# Patient Record
Sex: MALE | Race: WHITE | NOT HISPANIC OR LATINO | Employment: UNEMPLOYED | URBAN - METROPOLITAN AREA
[De-identification: names, ages, dates, MRNs, and addresses within clinical notes are randomized per-mention and may not be internally consistent; named-entity substitution may affect disease eponyms.]

---

## 2019-10-10 ENCOUNTER — HOSPITAL ENCOUNTER (OUTPATIENT)
Dept: URGENT CARE | Facility: CLINIC | Age: 19
Discharge: HOME | End: 2019-10-10

## 2022-02-27 ENCOUNTER — HOSPITAL ENCOUNTER (EMERGENCY)
Facility: HOSPITAL | Age: 22
Discharge: HOME | End: 2022-02-27
Attending: EMERGENCY MEDICINE | Admitting: EMERGENCY MEDICINE

## 2022-02-27 ENCOUNTER — APPOINTMENT (OUTPATIENT)
Dept: GENERAL RADIOLOGY | Facility: HOSPITAL | Age: 22
End: 2022-02-27

## 2022-02-27 VITALS
TEMPERATURE: 97.8 F | DIASTOLIC BLOOD PRESSURE: 87 MMHG | HEIGHT: 70 IN | HEART RATE: 89 BPM | BODY MASS INDEX: 26.99 KG/M2 | WEIGHT: 188.49 LBS | OXYGEN SATURATION: 98 % | RESPIRATION RATE: 16 BRPM | SYSTOLIC BLOOD PRESSURE: 154 MMHG

## 2022-02-27 DIAGNOSIS — S39.012A STRAIN OF LUMBAR REGION, INITIAL ENCOUNTER: Primary | ICD-10-CM

## 2022-02-27 PROCEDURE — 25010000002 KETOROLAC TROMETHAMINE PER 15 MG: Performed by: EMERGENCY MEDICINE

## 2022-02-27 PROCEDURE — 96372 THER/PROPH/DIAG INJ SC/IM: CPT

## 2022-02-27 PROCEDURE — 72100 X-RAY EXAM L-S SPINE 2/3 VWS: CPT

## 2022-02-27 PROCEDURE — 99282 EMERGENCY DEPT VISIT SF MDM: CPT

## 2022-02-27 PROCEDURE — 25010000002 ORPHENADRINE CITRATE PER 60 MG: Performed by: EMERGENCY MEDICINE

## 2022-02-27 RX ORDER — DIFLUNISAL 500 MG/1
500 TABLET, FILM COATED ORAL 2 TIMES DAILY PRN
Qty: 20 TABLET | Refills: 0 | Status: SHIPPED | OUTPATIENT
Start: 2022-02-27

## 2022-02-27 RX ORDER — METAXALONE 800 MG/1
800 TABLET ORAL 3 TIMES DAILY PRN
Qty: 21 TABLET | Refills: 0 | Status: SHIPPED | OUTPATIENT
Start: 2022-02-27

## 2022-02-27 RX ORDER — KETOROLAC TROMETHAMINE 30 MG/ML
60 INJECTION, SOLUTION INTRAMUSCULAR; INTRAVENOUS ONCE
Status: COMPLETED | OUTPATIENT
Start: 2022-02-27 | End: 2022-02-27

## 2022-02-27 RX ORDER — ORPHENADRINE CITRATE 30 MG/ML
60 INJECTION INTRAMUSCULAR; INTRAVENOUS ONCE
Status: COMPLETED | OUTPATIENT
Start: 2022-02-27 | End: 2022-02-27

## 2022-02-27 RX ADMIN — ORPHENADRINE CITRATE 60 MG: 60 INJECTION INTRAMUSCULAR; INTRAVENOUS at 07:35

## 2022-02-27 RX ADMIN — KETOROLAC TROMETHAMINE 60 MG: 30 INJECTION, SOLUTION INTRAMUSCULAR at 07:35

## 2022-08-30 ENCOUNTER — APPOINTMENT (OUTPATIENT)
Dept: GENERAL RADIOLOGY | Facility: HOSPITAL | Age: 22
End: 2022-08-30

## 2022-08-30 PROCEDURE — 73562 X-RAY EXAM OF KNEE 3: CPT

## 2022-08-30 PROCEDURE — 99283 EMERGENCY DEPT VISIT LOW MDM: CPT

## 2022-08-31 ENCOUNTER — HOSPITAL ENCOUNTER (EMERGENCY)
Facility: HOSPITAL | Age: 22
Discharge: HOME | End: 2022-08-31
Attending: EMERGENCY MEDICINE | Admitting: EMERGENCY MEDICINE

## 2022-08-31 VITALS
HEIGHT: 72 IN | RESPIRATION RATE: 18 BRPM | HEART RATE: 106 BPM | SYSTOLIC BLOOD PRESSURE: 141 MMHG | TEMPERATURE: 98.1 F | WEIGHT: 200.62 LBS | BODY MASS INDEX: 27.17 KG/M2 | DIASTOLIC BLOOD PRESSURE: 84 MMHG | OXYGEN SATURATION: 98 %

## 2022-08-31 DIAGNOSIS — S83.511A SPRAIN OF ANTERIOR CRUCIATE LIGAMENT OF RIGHT KNEE, INITIAL ENCOUNTER: Primary | ICD-10-CM

## 2023-07-08 ENCOUNTER — HOSPITAL ENCOUNTER (EMERGENCY)
Facility: HOSPITAL | Age: 23
Discharge: HOME | DRG: 603 | End: 2023-07-08
Attending: EMERGENCY MEDICINE | Admitting: EMERGENCY MEDICINE
Payer: COMMERCIAL

## 2023-07-08 VITALS
WEIGHT: 195.99 LBS | HEIGHT: 71 IN | TEMPERATURE: 98.3 F | RESPIRATION RATE: 16 BRPM | DIASTOLIC BLOOD PRESSURE: 87 MMHG | HEART RATE: 120 BPM | SYSTOLIC BLOOD PRESSURE: 142 MMHG | BODY MASS INDEX: 27.44 KG/M2 | OXYGEN SATURATION: 97 %

## 2023-07-08 DIAGNOSIS — L03.111 CELLULITIS OF RIGHT AXILLA: ICD-10-CM

## 2023-07-08 DIAGNOSIS — L02.411 ABSCESS OF RIGHT AXILLA: Primary | ICD-10-CM

## 2023-07-08 PROCEDURE — 87205 SMEAR GRAM STAIN: CPT | Performed by: EMERGENCY MEDICINE

## 2023-07-08 PROCEDURE — 87070 CULTURE OTHR SPECIMN AEROBIC: CPT | Performed by: EMERGENCY MEDICINE

## 2023-07-08 PROCEDURE — 87147 CULTURE TYPE IMMUNOLOGIC: CPT | Performed by: EMERGENCY MEDICINE

## 2023-07-08 PROCEDURE — 87186 SC STD MICRODIL/AGAR DIL: CPT | Performed by: EMERGENCY MEDICINE

## 2023-07-08 PROCEDURE — 63710000001 ONDANSETRON ODT 4 MG TABLET DISPERSIBLE: Performed by: EMERGENCY MEDICINE

## 2023-07-08 PROCEDURE — 99283 EMERGENCY DEPT VISIT LOW MDM: CPT

## 2023-07-08 RX ORDER — ONDANSETRON 4 MG/1
4 TABLET, ORALLY DISINTEGRATING ORAL ONCE
Status: COMPLETED | OUTPATIENT
Start: 2023-07-08 | End: 2023-07-08

## 2023-07-08 RX ORDER — LIDOCAINE HYDROCHLORIDE AND EPINEPHRINE 10; 10 MG/ML; UG/ML
10 INJECTION, SOLUTION INFILTRATION; PERINEURAL ONCE
Status: COMPLETED | OUTPATIENT
Start: 2023-07-08 | End: 2023-07-08

## 2023-07-08 RX ORDER — SULFAMETHOXAZOLE AND TRIMETHOPRIM 800; 160 MG/1; MG/1
1 TABLET ORAL 2 TIMES DAILY
Qty: 28 TABLET | Refills: 0 | Status: ON HOLD | OUTPATIENT
Start: 2023-07-08 | End: 2023-07-11

## 2023-07-08 RX ORDER — IBUPROFEN 800 MG/1
800 TABLET ORAL EVERY 6 HOURS PRN
Qty: 20 TABLET | Refills: 0 | Status: SHIPPED | OUTPATIENT
Start: 2023-07-08

## 2023-07-08 RX ORDER — GINSENG 100 MG
1 CAPSULE ORAL 2 TIMES DAILY
Qty: 14 G | Refills: 0 | Status: ON HOLD | OUTPATIENT
Start: 2023-07-08 | End: 2023-07-11

## 2023-07-08 RX ORDER — HYDROCODONE BITARTRATE AND ACETAMINOPHEN 7.5; 325 MG/1; MG/1
1 TABLET ORAL ONCE
Status: COMPLETED | OUTPATIENT
Start: 2023-07-08 | End: 2023-07-08

## 2023-07-08 RX ADMIN — ONDANSETRON 4 MG: 4 TABLET, ORALLY DISINTEGRATING ORAL at 14:06

## 2023-07-08 RX ADMIN — HYDROCODONE BITARTRATE AND ACETAMINOPHEN 1 TABLET: 7.5; 325 TABLET ORAL at 14:07

## 2023-07-08 RX ADMIN — LIDOCAINE HYDROCHLORIDE,EPINEPHRINE BITARTRATE 10 ML: 10; .01 INJECTION, SOLUTION INFILTRATION; PERINEURAL at 14:07

## 2023-07-08 NOTE — DISCHARGE INSTRUCTIONS
It is very important that you  the antibiotic that was prescribed to you during your last visit, as well as the 1 prescribed you today, and take both of them until they are all gone.  Also wash your wound 2 times a day with warm soapy water, apply the ointment that was prescribed to you today and then cover with a dressing.    The redness surrounding your abscess has been highlighted with a permanent marker.  Please keep an eye on the border of that outlined.  If the redness exceeds that after a full 24 hours of your antibiotic please return to the emergency department or follow-up with your primary care provider.    It anytime you develop a fever that cannot be controlled with Tylenol or Motrin, severe nausea, vomiting, diarrhea, or notice any odorous drainage from your wound please return to the ER otherwise follow-up with your primary care provider.

## 2023-07-08 NOTE — ED PROVIDER NOTES
Emergency Department Encounter    Date seen: 7/9/2023  Time: 1:54 PM EDT    Room number: 56/56    Chief Complaint: right axillary pain     HPI    History of Present Illness:  Patient is a 23 y.o. year old male who presents to the emergency department for evaluation of abscess to right axilla.  Patient states that he has had an abscess in his right axillary area for approximately 5 days.  He was seen 2 days ago at this facility and told that he had an enlarged lymph node with a questionable abscess.  He was placed on antibiotics.  He however was unable to retrieve those antibiotics from the pharmacy.  The area has continued to grow and become more red and painful in nature.  He rates his pain as a 9 on a scale 0-10.  He has had nausea but no vomiting or diarrhea.  He also denies fevers.    Independent Historian/Clinical History and Information was obtained by:   Patient and Chart    History is limited by: N/A      PCP: Canelo Bowers MD        Past Medical History:     No Known Allergies  History reviewed. No pertinent past medical history.  Past Surgical History:   Procedure Laterality Date    ANKLE OPEN REDUCTION INTERNAL FIXATION Right     KNEE ACL RECONSTRUCTION Right     KNEE ARTHROSCOPY W/ MENISCAL REPAIR Right      Family History   Problem Relation Age of Onset    Hypertension Father     Diabetes Maternal Grandfather     Hypertension Maternal Grandfather     Diabetes Cousin        Home Medications:  Prior to Admission medications    Medication Sig Start Date End Date Taking? Authorizing Provider   amoxicillin-clavulanate (AUGMENTIN) 875-125 MG per tablet Take 1 tablet by mouth 2 (Two) Times a Day for 10 days. 7/6/23 7/16/23  Alanna Santos APRN        Social History:   Social History     Tobacco Use    Smoking status: Every Day     Packs/day: 0.50     Years: 3.00     Pack years: 1.50     Types: Cigarettes    Smokeless tobacco: Never   Vaping Use    Vaping Use: Never used   Substance Use Topics    Alcohol use: Yes  "    Comment: occasional    Drug use: Not Currently     Types: Marijuana             Review of Systems:  Review of Systems   Constitutional:  Negative for chills and fever.   HENT:  Negative for congestion, ear pain and sore throat.    Eyes:  Negative for pain.   Respiratory:  Negative for cough, chest tightness and shortness of breath.    Cardiovascular:  Negative for chest pain.   Gastrointestinal:  Negative for abdominal pain, diarrhea, nausea and vomiting.   Genitourinary:  Negative for flank pain and hematuria.   Musculoskeletal:  Negative for joint swelling.   Skin:  Positive for wound (Abscess right armpit). Negative for pallor.   Neurological:  Negative for seizures and headaches.   All other systems reviewed and are negative.     Physical Exam:  /87 (BP Location: Left arm, Patient Position: Sitting)   Pulse 120   Temp 98.3 °F (36.8 °C) (Oral)   Resp 16   Ht 180.3 cm (71\")   Wt 88.9 kg (195 lb 15.8 oz)   SpO2 97%   BMI 27.33 kg/m²     Physical Exam  Vitals and nursing note reviewed.   Constitutional:       General: He is not in acute distress.     Appearance: Normal appearance. He is not ill-appearing, toxic-appearing or diaphoretic.   HENT:      Head: Normocephalic and atraumatic.      Mouth/Throat:      Mouth: Mucous membranes are moist.   Eyes:      General: No scleral icterus.  Cardiovascular:      Rate and Rhythm: Normal rate and regular rhythm.      Pulses: Normal pulses.      Heart sounds: Normal heart sounds.   Pulmonary:      Effort: Pulmonary effort is normal. No respiratory distress.      Breath sounds: Normal breath sounds.   Abdominal:      General: Abdomen is flat.      Palpations: Abdomen is soft.      Tenderness: There is no abdominal tenderness.   Musculoskeletal:         General: Normal range of motion.      Cervical back: Normal range of motion and neck supple.   Skin:     General: Skin is warm and dry.      Findings: Erythema (Right axilla and right upper arm.) present.      " Comments: Moderate sized abscess to right axilla area with some erythematous tissue extending down the back of the arm.  Area is red and warm to the touch.   Neurological:      Mental Status: He is alert and oriented to person, place, and time. Mental status is at baseline.      Sensory: No sensory deficit.                Procedures:  Incision & Drainage    Date/Time: 7/8/2023 3:19 PM  Performed by: Kacie Crow APRN  Authorized by: Gibran Brown MD     Consent:     Consent obtained:  Verbal    Consent given by:  Patient    Risks, benefits, and alternatives were discussed: yes      Risks discussed:  Bleeding, incomplete drainage, pain and infection    Alternatives discussed:  No treatment  Universal protocol:     Procedure explained and questions answered to patient or proxy's satisfaction: yes      Patient identity confirmed:  Verbally with patient  Location:     Type:  Abscess    Location:  Upper extremity    Upper extremity location:  Arm    Arm location:  R upper arm (right axilla)  Pre-procedure details:     Skin preparation:  Povidone-iodine  Sedation:     Sedation type:  None  Anesthesia:     Anesthesia method:  Local infiltration    Local anesthetic:  Lidocaine 1% WITH epi  Procedure type:     Complexity:  Simple  Procedure details:     Ultrasound guidance: no      Needle aspiration: no      Incision types:  Stab incision    Wound management:  Probed and deloculated and irrigated with saline    Drainage:  Bloody and purulent    Drainage amount:  Moderate    Wound treatment:  Wound left open  Post-procedure details:     Procedure completion:  Tolerated      Medical Decision Making:      Comorbidities that affect care:        External Notes reviewed:    Previous ED Note: Patient's last ER visit I have personally reviewed patient's previous medical encounters.      The following orders were placed and all results were independently analyzed by me:  Orders Placed This Encounter   Procedures     Incision & Drainage    Wound Culture - Wound, Axilla, Right       Medications Given in the Emergency Department:  Medications   ondansetron ODT (ZOFRAN-ODT) disintegrating tablet 4 mg (4 mg Oral Given 7/8/23 1406)   HYDROcodone-acetaminophen (NORCO) 7.5-325 MG per tablet 1 tablet (1 tablet Oral Given 7/8/23 1407)   lidocaine 1% - EPINEPHrine 1:987595 (XYLOCAINE W/EPI) 1 %-1:146703 injection 10 mL (10 mL Injection Given 7/8/23 1407)        ED Course:    ED Course as of 07/09/23 1716   Sat Jul 08, 2023   1530 Incision and drainage procedure completed.  Wound culture was obtained at that time.  Border of the redness surrounding the abscess was outlined with a permanent marker and patient given instructions to watch for increased reddening outside the border after taking antibiotics within 24 hours.  He was stressed the importance of medication compliance he.  Patient verbalized understanding. [MS]      ED Course User Index  [MS] Kacie Crow, KAN       Labs:    Lab Results (last 24 hours)       ** No results found for the last 24 hours. **             Imaging:    No Radiology Exams Resulted Within Past 24 Hours      Differential Diagnosis and Discussion:    Abscess: Differential diagnosis for an abscess includes but is not limited to bacterial or fungal infections, foreign body reactions, malignancies, and autoimmune or inflammatory conditions.        Patient Care Considerations:    CT EXTREMITY: I considered ordering an extremity CT, however abscess is fluctuant and does not appear to be tunneling.      Consultants/Shared Management Plan:    None    Social Determinants of Health:    Patient is independent, reliable, and has access to care.       Disposition and Care Coordination:    Discharged: The patient is suitable and stable for discharge with no need for consideration of observation or admission.    I have explained the patient´s condition, diagnoses and treatment plan based on the information available  to me at this time. I have answered questions and addressed any concerns. The patient has a good  understanding of the patient´s diagnosis, condition, and treatment plan as can be expected at this point. The vital signs have been stable. The patient´s condition is stable and appropriate for discharge from the emergency department.      The patient will pursue further outpatient evaluation with the primary care physician or other designated or consulting physician as outlined in the discharge instructions. They are agreeable to this plan of care and follow-up instructions have been explained in detail. The patient has received these instructions in written format and have expressed an understanding of the discharge instructions. The patient is aware that any significant change in condition or worsening of symptoms should prompt an immediate return to this or the closest emergency department or call to 911.    MDM  Number of Diagnoses or Management Options  Abscess of right axilla: new and does not require workup  Cellulitis of right axilla: new and does not require workup     Amount and/or Complexity of Data Reviewed  Review and summarize past medical records: yes (I have personally reviewed patient's previous medical encounters.  )    Risk of Complications, Morbidity, and/or Mortality  Presenting problems: moderate  Diagnostic procedures: low  Management options: moderate    Patient Progress  Patient progress: stable       Final diagnoses:   Abscess of right axilla   Cellulitis of right axilla        ED Disposition       ED Disposition   Discharge    Condition   Stable    Comment   --               This medical record created using voice recognition software.                       Kacie Crow APRN  07/09/23 1716       Kacie Crow APRN  07/09/23 1716

## 2023-07-10 LAB
ALBUMIN SERPL-MCNC: 4.6 G/DL (ref 3.5–5.2)
ALBUMIN/GLOB SERPL: 1.6 G/DL
ALP SERPL-CCNC: 76 U/L (ref 39–117)
ALT SERPL W P-5'-P-CCNC: 21 U/L (ref 1–41)
ANION GAP SERPL CALCULATED.3IONS-SCNC: 14.2 MMOL/L (ref 5–15)
AST SERPL-CCNC: 21 U/L (ref 1–40)
BASOPHILS # BLD AUTO: 0.02 10*3/MM3 (ref 0–0.2)
BASOPHILS NFR BLD AUTO: 0.3 % (ref 0–1.5)
BILIRUB SERPL-MCNC: 0.2 MG/DL (ref 0–1.2)
BUN SERPL-MCNC: 18 MG/DL (ref 6–20)
BUN/CREAT SERPL: 18 (ref 7–25)
CALCIUM SPEC-SCNC: 9.3 MG/DL (ref 8.6–10.5)
CHLORIDE SERPL-SCNC: 101 MMOL/L (ref 98–107)
CO2 SERPL-SCNC: 23.8 MMOL/L (ref 22–29)
CREAT SERPL-MCNC: 1 MG/DL (ref 0.76–1.27)
CRP SERPL-MCNC: 1.79 MG/DL (ref 0–0.5)
D-LACTATE SERPL-SCNC: 2.7 MMOL/L (ref 0.5–2)
DEPRECATED RDW RBC AUTO: 39.8 FL (ref 37–54)
EGFRCR SERPLBLD CKD-EPI 2021: 107.1 ML/MIN/1.73
EOSINOPHIL # BLD AUTO: 0.17 10*3/MM3 (ref 0–0.4)
EOSINOPHIL NFR BLD AUTO: 2.4 % (ref 0.3–6.2)
ERYTHROCYTE [DISTWIDTH] IN BLOOD BY AUTOMATED COUNT: 12.6 % (ref 12.3–15.4)
ERYTHROCYTE [SEDIMENTATION RATE] IN BLOOD: 9 MM/HR (ref 0–15)
GLOBULIN UR ELPH-MCNC: 2.9 GM/DL
GLUCOSE SERPL-MCNC: 71 MG/DL (ref 65–99)
HCT VFR BLD AUTO: 40.4 % (ref 37.5–51)
HGB BLD-MCNC: 13.7 G/DL (ref 13–17.7)
HOLD SPECIMEN: NORMAL
HOLD SPECIMEN: NORMAL
IMM GRANULOCYTES # BLD AUTO: 0.02 10*3/MM3 (ref 0–0.05)
IMM GRANULOCYTES NFR BLD AUTO: 0.3 % (ref 0–0.5)
LYMPHOCYTES # BLD AUTO: 2.49 10*3/MM3 (ref 0.7–3.1)
LYMPHOCYTES NFR BLD AUTO: 34.9 % (ref 19.6–45.3)
MCH RBC QN AUTO: 29.5 PG (ref 26.6–33)
MCHC RBC AUTO-ENTMCNC: 33.9 G/DL (ref 31.5–35.7)
MCV RBC AUTO: 86.9 FL (ref 79–97)
MONOCYTES # BLD AUTO: 0.67 10*3/MM3 (ref 0.1–0.9)
MONOCYTES NFR BLD AUTO: 9.4 % (ref 5–12)
NEUTROPHILS NFR BLD AUTO: 3.77 10*3/MM3 (ref 1.7–7)
NEUTROPHILS NFR BLD AUTO: 52.7 % (ref 42.7–76)
NRBC BLD AUTO-RTO: 0 /100 WBC (ref 0–0.2)
PLATELET # BLD AUTO: 254 10*3/MM3 (ref 140–450)
PMV BLD AUTO: 9.8 FL (ref 6–12)
POTASSIUM SERPL-SCNC: 3.1 MMOL/L (ref 3.5–5.2)
PROT SERPL-MCNC: 7.5 G/DL (ref 6–8.5)
RBC # BLD AUTO: 4.65 10*6/MM3 (ref 4.14–5.8)
SODIUM SERPL-SCNC: 139 MMOL/L (ref 136–145)
WBC NRBC COR # BLD: 7.14 10*3/MM3 (ref 3.4–10.8)
WHOLE BLOOD HOLD COAG: NORMAL
WHOLE BLOOD HOLD SPECIMEN: NORMAL

## 2023-07-10 PROCEDURE — 36415 COLL VENOUS BLD VENIPUNCTURE: CPT | Performed by: NURSE PRACTITIONER

## 2023-07-10 PROCEDURE — 80053 COMPREHEN METABOLIC PANEL: CPT | Performed by: NURSE PRACTITIONER

## 2023-07-10 PROCEDURE — 87040 BLOOD CULTURE FOR BACTERIA: CPT | Performed by: NURSE PRACTITIONER

## 2023-07-10 PROCEDURE — 86140 C-REACTIVE PROTEIN: CPT | Performed by: NURSE PRACTITIONER

## 2023-07-10 PROCEDURE — 83605 ASSAY OF LACTIC ACID: CPT | Performed by: NURSE PRACTITIONER

## 2023-07-10 PROCEDURE — 85652 RBC SED RATE AUTOMATED: CPT | Performed by: NURSE PRACTITIONER

## 2023-07-10 PROCEDURE — 85025 COMPLETE CBC W/AUTO DIFF WBC: CPT | Performed by: NURSE PRACTITIONER

## 2023-07-10 PROCEDURE — 99285 EMERGENCY DEPT VISIT HI MDM: CPT

## 2023-07-11 ENCOUNTER — APPOINTMENT (OUTPATIENT)
Dept: ULTRASOUND IMAGING | Facility: HOSPITAL | Age: 23
DRG: 603 | End: 2023-07-11
Payer: COMMERCIAL

## 2023-07-11 ENCOUNTER — HOSPITAL ENCOUNTER (INPATIENT)
Facility: HOSPITAL | Age: 23
LOS: 3 days | Discharge: HOME | DRG: 603 | End: 2023-07-14
Attending: EMERGENCY MEDICINE | Admitting: INTERNAL MEDICINE
Payer: COMMERCIAL

## 2023-07-11 DIAGNOSIS — L03.113 CELLULITIS OF RIGHT UPPER EXTREMITY: ICD-10-CM

## 2023-07-11 DIAGNOSIS — L03.113 CELLULITIS OF RIGHT ARM: ICD-10-CM

## 2023-07-11 DIAGNOSIS — L02.411 CUTANEOUS ABSCESS OF RIGHT AXILLA: Primary | ICD-10-CM

## 2023-07-11 DIAGNOSIS — L02.419 ABSCESS OF AXILLA: ICD-10-CM

## 2023-07-11 LAB
ALBUMIN SERPL-MCNC: 3.7 G/DL (ref 3.5–5.2)
ANION GAP SERPL CALCULATED.3IONS-SCNC: 11 MMOL/L (ref 5–15)
BACTERIA SPEC AEROBE CULT: ABNORMAL
BUN SERPL-MCNC: 16 MG/DL (ref 6–20)
BUN/CREAT SERPL: 18.2 (ref 7–25)
CALCIUM SPEC-SCNC: 8.3 MG/DL (ref 8.6–10.5)
CHLORIDE SERPL-SCNC: 108 MMOL/L (ref 98–107)
CO2 SERPL-SCNC: 19 MMOL/L (ref 22–29)
CREAT SERPL-MCNC: 0.88 MG/DL (ref 0.76–1.27)
D-LACTATE SERPL-SCNC: 0.6 MMOL/L (ref 0.5–2)
EGFRCR SERPLBLD CKD-EPI 2021: 122.4 ML/MIN/1.73
GLUCOSE SERPL-MCNC: 96 MG/DL (ref 65–99)
GRAM STN SPEC: ABNORMAL
PHOSPHATE SERPL-MCNC: 3.1 MG/DL (ref 2.5–4.5)
POTASSIUM SERPL-SCNC: 3.8 MMOL/L (ref 3.5–5.2)
SODIUM SERPL-SCNC: 138 MMOL/L (ref 136–145)

## 2023-07-11 PROCEDURE — 25010000002 HEPARIN (PORCINE) PER 1000 UNITS: Performed by: FAMILY MEDICINE

## 2023-07-11 PROCEDURE — 25010000002 ONDANSETRON PER 1 MG: Performed by: NURSE PRACTITIONER

## 2023-07-11 PROCEDURE — 25010000002 KETOROLAC TROMETHAMINE PER 15 MG: Performed by: NURSE PRACTITIONER

## 2023-07-11 PROCEDURE — 25010000002 HYDROMORPHONE 1 MG/ML SOLUTION: Performed by: NURSE PRACTITIONER

## 2023-07-11 PROCEDURE — 25010000002 HYDROMORPHONE 1 MG/ML SOLUTION: Performed by: INTERNAL MEDICINE

## 2023-07-11 PROCEDURE — 25010000002 VANCOMYCIN 5 G RECONSTITUTED SOLUTION: Performed by: BEHAVIOR TECHNICIAN

## 2023-07-11 PROCEDURE — 87040 BLOOD CULTURE FOR BACTERIA: CPT | Performed by: NURSE PRACTITIONER

## 2023-07-11 PROCEDURE — 25010000002 PIPERACILLIN SOD-TAZOBACTAM PER 1 G: Performed by: FAMILY MEDICINE

## 2023-07-11 PROCEDURE — 76882 US LMTD JT/FCL EVL NVASC XTR: CPT

## 2023-07-11 PROCEDURE — 99254 IP/OBS CNSLTJ NEW/EST MOD 60: CPT | Performed by: SURGERY

## 2023-07-11 PROCEDURE — 80069 RENAL FUNCTION PANEL: CPT | Performed by: FAMILY MEDICINE

## 2023-07-11 PROCEDURE — 87205 SMEAR GRAM STAIN: CPT | Performed by: NURSE PRACTITIONER

## 2023-07-11 PROCEDURE — 99222 1ST HOSP IP/OBS MODERATE 55: CPT | Performed by: FAMILY MEDICINE

## 2023-07-11 PROCEDURE — 87147 CULTURE TYPE IMMUNOLOGIC: CPT | Performed by: NURSE PRACTITIONER

## 2023-07-11 PROCEDURE — 83605 ASSAY OF LACTIC ACID: CPT | Performed by: NURSE PRACTITIONER

## 2023-07-11 PROCEDURE — 25010000002 PIPERACILLIN SOD-TAZOBACTAM PER 1 G: Performed by: BEHAVIOR TECHNICIAN

## 2023-07-11 PROCEDURE — 87070 CULTURE OTHR SPECIMN AEROBIC: CPT | Performed by: NURSE PRACTITIONER

## 2023-07-11 RX ORDER — HYDROCODONE BITARTRATE AND ACETAMINOPHEN 7.5; 325 MG/1; MG/1
1 TABLET ORAL EVERY 4 HOURS PRN
Status: DISCONTINUED | OUTPATIENT
Start: 2023-07-11 | End: 2023-07-14 | Stop reason: HOSPADM

## 2023-07-11 RX ORDER — SODIUM CHLORIDE 0.9 % (FLUSH) 0.9 %
10 SYRINGE (ML) INJECTION AS NEEDED
Status: DISCONTINUED | OUTPATIENT
Start: 2023-07-11 | End: 2023-07-14 | Stop reason: HOSPADM

## 2023-07-11 RX ORDER — AMOXICILLIN 250 MG
2 CAPSULE ORAL 2 TIMES DAILY
Status: DISCONTINUED | OUTPATIENT
Start: 2023-07-11 | End: 2023-07-14 | Stop reason: HOSPADM

## 2023-07-11 RX ORDER — SODIUM CHLORIDE 9 MG/ML
100 INJECTION, SOLUTION INTRAVENOUS CONTINUOUS
Status: DISCONTINUED | OUTPATIENT
Start: 2023-07-11 | End: 2023-07-11

## 2023-07-11 RX ORDER — POLYETHYLENE GLYCOL 3350 17 G/17G
17 POWDER, FOR SOLUTION ORAL DAILY PRN
Status: DISCONTINUED | OUTPATIENT
Start: 2023-07-11 | End: 2023-07-14 | Stop reason: HOSPADM

## 2023-07-11 RX ORDER — ONDANSETRON 2 MG/ML
4 INJECTION INTRAMUSCULAR; INTRAVENOUS ONCE
Status: COMPLETED | OUTPATIENT
Start: 2023-07-11 | End: 2023-07-11

## 2023-07-11 RX ORDER — CEFTRIAXONE SODIUM 1 G/50ML
1 INJECTION, SOLUTION INTRAVENOUS ONCE
Status: DISCONTINUED | OUTPATIENT
Start: 2023-07-11 | End: 2023-07-11

## 2023-07-11 RX ORDER — VANCOMYCIN/0.9 % SOD CHLORIDE 1.5G/250ML
1500 PLASTIC BAG, INJECTION (ML) INTRAVENOUS EVERY 12 HOURS
Status: DISCONTINUED | OUTPATIENT
Start: 2023-07-11 | End: 2023-07-12

## 2023-07-11 RX ORDER — NITROGLYCERIN 0.4 MG/1
0.4 TABLET SUBLINGUAL
Status: DISCONTINUED | OUTPATIENT
Start: 2023-07-11 | End: 2023-07-14 | Stop reason: HOSPADM

## 2023-07-11 RX ORDER — NICOTINE 21 MG/24HR
1 PATCH, TRANSDERMAL 24 HOURS TRANSDERMAL
Status: DISCONTINUED | OUTPATIENT
Start: 2023-07-11 | End: 2023-07-14 | Stop reason: HOSPADM

## 2023-07-11 RX ORDER — BISACODYL 10 MG
10 SUPPOSITORY, RECTAL RECTAL DAILY PRN
Status: DISCONTINUED | OUTPATIENT
Start: 2023-07-11 | End: 2023-07-14 | Stop reason: HOSPADM

## 2023-07-11 RX ORDER — POTASSIUM CHLORIDE 750 MG/1
40 CAPSULE, EXTENDED RELEASE ORAL ONCE
Status: COMPLETED | OUTPATIENT
Start: 2023-07-11 | End: 2023-07-11

## 2023-07-11 RX ORDER — BISACODYL 5 MG/1
5 TABLET, DELAYED RELEASE ORAL DAILY PRN
Status: DISCONTINUED | OUTPATIENT
Start: 2023-07-11 | End: 2023-07-14 | Stop reason: HOSPADM

## 2023-07-11 RX ORDER — SODIUM CHLORIDE 9 MG/ML
40 INJECTION, SOLUTION INTRAVENOUS AS NEEDED
Status: DISCONTINUED | OUTPATIENT
Start: 2023-07-11 | End: 2023-07-14 | Stop reason: HOSPADM

## 2023-07-11 RX ORDER — SODIUM CHLORIDE 0.9 % (FLUSH) 0.9 %
10 SYRINGE (ML) INJECTION EVERY 12 HOURS SCHEDULED
Status: DISCONTINUED | OUTPATIENT
Start: 2023-07-11 | End: 2023-07-14 | Stop reason: HOSPADM

## 2023-07-11 RX ORDER — HEPARIN SODIUM 5000 [USP'U]/ML
5000 INJECTION, SOLUTION INTRAVENOUS; SUBCUTANEOUS EVERY 8 HOURS SCHEDULED
Status: DISCONTINUED | OUTPATIENT
Start: 2023-07-11 | End: 2023-07-14 | Stop reason: HOSPADM

## 2023-07-11 RX ORDER — ONDANSETRON 2 MG/ML
4 INJECTION INTRAMUSCULAR; INTRAVENOUS EVERY 6 HOURS PRN
Status: DISCONTINUED | OUTPATIENT
Start: 2023-07-11 | End: 2023-07-14 | Stop reason: HOSPADM

## 2023-07-11 RX ORDER — KETOROLAC TROMETHAMINE 15 MG/ML
15 INJECTION, SOLUTION INTRAMUSCULAR; INTRAVENOUS ONCE
Status: COMPLETED | OUTPATIENT
Start: 2023-07-11 | End: 2023-07-11

## 2023-07-11 RX ADMIN — SODIUM CHLORIDE 100 ML/HR: 9 INJECTION, SOLUTION INTRAVENOUS at 04:03

## 2023-07-11 RX ADMIN — PIPERACILLIN AND TAZOBACTAM 3.38 G: 3; .375 INJECTION, POWDER, LYOPHILIZED, FOR SOLUTION INTRAVENOUS at 00:47

## 2023-07-11 RX ADMIN — VANCOMYCIN HYDROCHLORIDE 1750 MG: 5 INJECTION, POWDER, LYOPHILIZED, FOR SOLUTION INTRAVENOUS at 01:25

## 2023-07-11 RX ADMIN — HYDROCODONE BITARTRATE AND ACETAMINOPHEN 1 TABLET: 7.5; 325 TABLET ORAL at 08:27

## 2023-07-11 RX ADMIN — SODIUM CHLORIDE 1000 ML: 9 INJECTION, SOLUTION INTRAVENOUS at 00:30

## 2023-07-11 RX ADMIN — HYDROMORPHONE HYDROCHLORIDE 0.5 MG: 1 INJECTION, SOLUTION INTRAMUSCULAR; INTRAVENOUS; SUBCUTANEOUS at 09:28

## 2023-07-11 RX ADMIN — Medication 10 ML: at 21:16

## 2023-07-11 RX ADMIN — ONDANSETRON 4 MG: 2 INJECTION INTRAMUSCULAR; INTRAVENOUS at 00:30

## 2023-07-11 RX ADMIN — HYDROCODONE BITARTRATE AND ACETAMINOPHEN 1 TABLET: 7.5; 325 TABLET ORAL at 22:26

## 2023-07-11 RX ADMIN — KETOROLAC TROMETHAMINE 15 MG: 15 INJECTION, SOLUTION INTRAMUSCULAR; INTRAVENOUS at 00:29

## 2023-07-11 RX ADMIN — SENNOSIDES AND DOCUSATE SODIUM 2 TABLET: 50; 8.6 TABLET ORAL at 08:28

## 2023-07-11 RX ADMIN — POTASSIUM CHLORIDE 40 MEQ: 750 CAPSULE, EXTENDED RELEASE ORAL at 01:27

## 2023-07-11 RX ADMIN — HYDROMORPHONE HYDROCHLORIDE 0.5 MG: 1 INJECTION, SOLUTION INTRAMUSCULAR; INTRAVENOUS; SUBCUTANEOUS at 00:30

## 2023-07-11 RX ADMIN — HEPARIN SODIUM 5000 UNITS: 5000 INJECTION INTRAVENOUS; SUBCUTANEOUS at 21:16

## 2023-07-11 RX ADMIN — SENNOSIDES AND DOCUSATE SODIUM 2 TABLET: 50; 8.6 TABLET ORAL at 21:16

## 2023-07-11 RX ADMIN — PIPERACILLIN AND TAZOBACTAM 3.38 G: 3; .375 INJECTION, POWDER, LYOPHILIZED, FOR SOLUTION INTRAVENOUS at 07:55

## 2023-07-11 RX ADMIN — NICOTINE 1 PATCH: 14 PATCH, EXTENDED RELEASE TRANSDERMAL at 08:28

## 2023-07-11 RX ADMIN — VANCOMYCIN HYDROCHLORIDE 1500 MG: 5 INJECTION, POWDER, LYOPHILIZED, FOR SOLUTION INTRAVENOUS at 13:37

## 2023-07-11 RX ADMIN — HEPARIN SODIUM 5000 UNITS: 5000 INJECTION INTRAVENOUS; SUBCUTANEOUS at 13:37

## 2023-07-11 NOTE — H&P
"[facility]   HISTORY AND PHYSICAL    Patient Name: Mat Middleton  : 2000  MRN: [id number]  Primary Care Physician:  Canelo Bowers MD  Date of admission: 2023    Subjective   Subjective     Chief Complaint:   Right axilla swelling  History of Present Illness  Patient is a 23 y.o. year old male with essentially no past medical history.  He presents to the emergency department for evaluation of right axilla swelling, redness and pain. Patient is on his third visit for complaint. He was seen and treated for abscess initially, 2023, then returned for I&D 2023. Patient was given oral abx for treatment. States pain, swelling and redness have gotten worse. Denies fever/chills. Still taking his Bactrim he was prescribed last visit.  As of today he started having some drainage from the area however there is substantial swelling with lymphadenopathy extending to the elbow.  ER doctor wants to admit for further evaluation and IV antibiotics.  The plan is to I&D with packing placement here in the ER  Review of Systems   General-mild chills subjective fever  Skin-redness swelling right axilla with swelling and \"knots\" down to the elbow with associated pain.  All other systems reviewed and subsequently negative  Personal History     Past medical history  None    Past Surgical History:   Procedure Laterality Date   • ANKLE OPEN REDUCTION INTERNAL FIXATION Right    • KNEE ACL RECONSTRUCTION Right    • KNEE ARTHROSCOPY W/ MENISCAL REPAIR Right        Family History: family history includes Diabetes in his cousin and maternal grandfather; Hypertension in his father and maternal grandfather. Otherwise pertinent FHx was reviewed and not pertinent to current issue.    Social History:  reports that he has been smoking cigarettes. He has a 1.50 pack-year smoking history. He has never used smokeless tobacco. He reports current alcohol use. He reports that he does not currently use drugs after having used the " following drugs: Marijuana.    Home Medications:  amoxicillin-clavulanate, bacitracin, ibuprofen, and sulfamethoxazole-trimethoprim    Allergies:  No Known Allergies    Objective    Objective     Vitals:   Temp:  [97.8 °F (36.6 °C)] 97.8 °F (36.6 °C)  Heart Rate:  [87] 87  Resp:  [18] 18  BP: (142)/(72) 142/72    Physical Exam    Result Review    Result Review:  I have personally reviewed the results from the time of this admission to 7/11/2023 01:56 EDT and agree with these findings:  [x]  Laboratory list / accordion  []  Microbiology  []  Radiology  []  EKG/Telemetry   []  Cardiology/Vascular   []  Pathology  []  Old records  []  Other:  Most notable findings include: The only pertinent positive was a lactic of 2.7      Assessment & Plan   Assessment / Plan     Brief Patient Summary:  Mat Middleton is a 23 y.o. male who presents to the ER for the third time in less than a week for redness and swelling of the right axilla.  The area has now organized and is starting to drain.  Unfortunately also has associated lymphadenopathy.  The ER doctor will I&D the area and place packing, we will admit for IV antibiotics    Active Hospital Problems:  Active Hospital Problems    Diagnosis    • **Abscess of axilla    Lymphadenopathy right arm  Plan:   Admit the patient to the hospitalist service  Patient was started on broad-spectrum antibiotics of Zosyn and vancomycin in the ER which we will continue on the floor  We will hydrate with normal saline  We will continue to monitor his lab work including lactic acid levels  We will treat the pain with oral pain medications trying to limit the amount and duration to 3 days or less  Consult wound care for packing placement      DVT prophylaxis:  No DVT prophylaxis order currently exists.    CODE STATUS:    Code Status (Patient has no pulse and is not breathing): CPR (Attempt to Resuscitate)  Medical Interventions (Patient has pulse or is breathing): Full Support    Admission  Status:  I believe this patient meets inpatient status.    Maulik Huerta, DO

## 2023-07-11 NOTE — H&P
[facility]   HISTORY AND PHYSICAL    Patient Name: Mat Middleton  : 2000  MRN: [id number]  Primary Care Physician:  Canelo Bowers MD  Date of admission: 2023    Subjective   Subjective     Chief Complaint:   Right axillary pain and swelling      HPI:    Mat Middleton is a 23 y.o. male admitted last night from ER by hospitalist group.  Patient was seen in ER few times in last 1 week with oral antibiotics for the same problem.  Patient has swelling in the right axillary area with painful cystic lesion.  He says oral antibiotic did not work he came back yesterday, swelling was worse and painful.  There was no drainage.  He had throbbing in the arm.  There is no complaints of numbness tingling or swelling of the hand.  Patient admitted by hospitalist and started antibiotics.  Later on they found out patient belongs to our group and transferred to my care.  When I saw patient around 3 PM he is awake alert his swelling persist but he is feeling slightly better.        Review of Systems:    No fever chills  No chest pain  No shortness of breath  Pain in the arm and axillary area  Difficulty with movement    Personal History     History reviewed. No pertinent past medical history.    Past Surgical History:   Procedure Laterality Date    ANKLE OPEN REDUCTION INTERNAL FIXATION Right     KNEE ACL RECONSTRUCTION Right     KNEE ARTHROSCOPY W/ MENISCAL REPAIR Right        Family History: family history includes Diabetes in his cousin and maternal grandfather; Hypertension in his father and maternal grandfather. Otherwise pertinent FHx was reviewed and not pertinent to current issue.    Social History:  reports that he has been smoking cigarettes. He has a 1.50 pack-year smoking history. He has never used smokeless tobacco. He reports current alcohol use. He reports that he does not currently use drugs after having used the following drugs: Marijuana.    Home Medications:  ibuprofen and  mupirocin      Allergies:  No Known Allergies    Objective   Objective     Vitals:   Temp:  [97.4 °F (36.3 °C)-97.8 °F (36.6 °C)] 97.4 °F (36.3 °C)  Heart Rate:  [] 68  Resp:  [17-18] 18  BP: (121-142)/(65-90) 134/65    Physical Exam    Young male not in acute distress tired looking  Heart regular and lungs clear  Right axillary area on the medial aspect with an abscess/swelling extending into the axillary area and upper proximal arm  Tenderness present slight fluctuation  Peripheral pulses and capillary refill good        I have personally reviewed the results from the time of this admission to 7/11/2023 15:23 EDT and agree with these findings:  [x]  Laboratory  []  Microbiology  []  Radiology  []  EKG/Telemetry   []  Cardiology/Vascular   []  Pathology  []  Old records  []  Other:    CBC:    WBC   Date Value Ref Range Status   07/10/2023 7.14 3.40 - 10.80 10*3/mm3 Final     RBC   Date Value Ref Range Status   07/10/2023 4.65 4.14 - 5.80 10*6/mm3 Final     Hemoglobin   Date Value Ref Range Status   07/10/2023 13.7 13.0 - 17.7 g/dL Final     Hematocrit   Date Value Ref Range Status   07/10/2023 40.4 37.5 - 51.0 % Final     MCV   Date Value Ref Range Status   07/10/2023 86.9 79.0 - 97.0 fL Final     MCH   Date Value Ref Range Status   07/10/2023 29.5 26.6 - 33.0 pg Final     MCHC   Date Value Ref Range Status   07/10/2023 33.9 31.5 - 35.7 g/dL Final     RDW   Date Value Ref Range Status   07/10/2023 12.6 12.3 - 15.4 % Final     RDW-SD   Date Value Ref Range Status   07/10/2023 39.8 37.0 - 54.0 fl Final     MPV   Date Value Ref Range Status   07/10/2023 9.8 6.0 - 12.0 fL Final     Platelets   Date Value Ref Range Status   07/10/2023 254 140 - 450 10*3/mm3 Final     Neutrophil %   Date Value Ref Range Status   07/10/2023 52.7 42.7 - 76.0 % Final     Lymphocyte %   Date Value Ref Range Status   07/10/2023 34.9 19.6 - 45.3 % Final     Monocyte %   Date Value Ref Range Status   07/10/2023 9.4 5.0 - 12.0 % Final      Eosinophil %   Date Value Ref Range Status   07/10/2023 2.4 0.3 - 6.2 % Final     Basophil %   Date Value Ref Range Status   07/10/2023 0.3 0.0 - 1.5 % Final     Immature Grans %   Date Value Ref Range Status   07/10/2023 0.3 0.0 - 0.5 % Final     Neutrophils, Absolute   Date Value Ref Range Status   07/10/2023 3.77 1.70 - 7.00 10*3/mm3 Final     Lymphocytes, Absolute   Date Value Ref Range Status   07/10/2023 2.49 0.70 - 3.10 10*3/mm3 Final     Monocytes, Absolute   Date Value Ref Range Status   07/10/2023 0.67 0.10 - 0.90 10*3/mm3 Final     Eosinophils, Absolute   Date Value Ref Range Status   07/10/2023 0.17 0.00 - 0.40 10*3/mm3 Final     Basophils, Absolute   Date Value Ref Range Status   07/10/2023 0.02 0.00 - 0.20 10*3/mm3 Final     Immature Grans, Absolute   Date Value Ref Range Status   07/10/2023 0.02 0.00 - 0.05 10*3/mm3 Final     nRBC   Date Value Ref Range Status   07/10/2023 0.0 0.0 - 0.2 /100 WBC Final        BMP:    Lab Results   Component Value Date    GLUCOSE 96 07/11/2023    BUN 16 07/11/2023    CREATININE 0.88 07/11/2023    BCR 18.2 07/11/2023    K 3.8 07/11/2023    CO2 19.0 (L) 07/11/2023    CALCIUM 8.3 (L) 07/11/2023    ALBUMIN 3.7 07/11/2023    AST 21 07/10/2023    ALT 21 07/10/2023        No radiology results for the last day           Assessment & Plan   Assessment / Plan       Current Diagnosis:  Active Hospital Problems    Diagnosis     **Abscess of axilla      Plan:   Continue antibiotics.  Continue fluids.  Anti-inflammatory and pain control.  Surgical consult for possible drainage.  Further management be based on clinical course, lab results and consultant input      DVT prophylaxis:  Medical DVT prophylaxis orders are present.    GI Prophylaxis:       Pepcid    CODE STATUS:    Code Status (Patient has no pulse and is not breathing): CPR (Attempt to Resuscitate)  Medical Interventions (Patient has pulse or is breathing): Full Support    Admission Status:  I believe this patient meets  inpatient status.             I have dictated this note utilizing Dragon Dictation.             Please note that portions of this note were completed with a voice recognition program.             Part of this note may be an electronic transcription/translation of spoken language to printed text         using the Dragon Dictation System.       Electronically signed by Canelo Bowers MD, 07/11/23, 3:23 PM EDT.    Total time spent with in evaluation and management: 33 minutes

## 2023-07-11 NOTE — PLAN OF CARE
Goal Outcome Evaluation:   Patient A&O X4. VSS. Dressing has been changed. NPO after midnight for possible abscess drain.

## 2023-07-11 NOTE — PROGRESS NOTES
"[facility] Clinical Pharmacy Services: Vancomycin Pharmacokinetic Initial Consult Note    Mat Middleton is a 23 y.o. male who is on day 1 of pharmacy to dose vancomycin for Skin and Soft Tissue.    Consult Information  Consulting Provider: Triplet  Planned Duration of Therapy: 5 days  Was Patient Receiving Prior to Admission/Consult?: No  Loading Dose Ordered or Given: 1750 mg on 23 at 0130  PK/PD Target: -600 mg/L.hr   Relevant ID History:   Wound Culture   Date Value Ref Range Status   2023 Moderate growth (3+) Staphylococcus aureus, MRSA (A)  Preliminary     Comment:       Methicillin resistant Staphylococcus aureus, Patient may be an isolation risk.      Other Antimicrobials: Piperacillin/Tazobactam  Imaging Reviewed?: Yes    Microbiology Data  MRSA PCR performed: No; Result: Not ordered due to excluded indication or presence of suspected abscess  Culture/Source:   wound    Vitals/Labs  Ht: 180.3 cm (71\"); Wt: 91 kg (200 lb 9.9 oz)  Temp (24hrs), Av.8 °F (36.6 °C), Min:97.8 °F (36.6 °C), Max:97.8 °F (36.6 °C)   Estimated Creatinine Clearance: 130.3 mL/min (by C-G formula based on SCr of 1 mg/dL).        Results from last 7 days   Lab Units 07/10/23  2251 23  1851   CREATININE mg/dL 1.00 0.89   WBC 10*3/mm3 7.14 12.85*     Assessment/Plan:    Vancomycin Dose:  1500 mg IV every 12 hours; which provides the following predicted parameters:  Exposure target: AUC24 (range)400-600 mg/L.hr   AUC24,ss: 567 mg/L.hr  PAUC*: 82 %  Ctrough,ss: 17.1 mg/L  Pconc*: 38 %  Tox.: 13 %  Vanc Random ordered for 23 at 0900  Patient has order for Renal Function Panel    Pharmacy will follow patient's kidney function and will adjust doses and obtain levels as necessary. Thank you for involving pharmacy in this patient's care. Please contact pharmacy with any questions or concerns.                           Sean Blackwood [facility]  Clinical Pharmacist   "

## 2023-07-11 NOTE — ED PROVIDER NOTES
Time: 10:42 PM EDT  Date of encounter:  7/10/2023  Independent Historian/Clinical History and Information was obtained by:   Patient    History is limited by: N/A    Chief Complaint   Patient presents with    Arm Swelling         History of Present Illness:  Patient is a 23 y.o. year old male who presents to the emergency department for evaluation of right axilla swelling, redness and pain. Patient is on his third visit for complaint. He was seen and treated for abscess initially, 7/6/2023, then returned for I&D 7/8/2023. Patient was given oral abx for treatment. States pain, swelling and redness have gotten worse. Denies fever/chills. Still taking his Bactrim he was prescribed last visit.     HPI    Patient Care Team  Primary Care Provider: Canelo Bowers MD    Past Medical History:     No Known Allergies  No past medical history on file.  Past Surgical History:   Procedure Laterality Date    ANKLE OPEN REDUCTION INTERNAL FIXATION Right     KNEE ACL RECONSTRUCTION Right     KNEE ARTHROSCOPY W/ MENISCAL REPAIR Right      Family History   Problem Relation Age of Onset    Hypertension Father     Diabetes Maternal Grandfather     Hypertension Maternal Grandfather     Diabetes Cousin        Home Medications:  Prior to Admission medications    Medication Sig Start Date End Date Taking? Authorizing Provider   amoxicillin-clavulanate (AUGMENTIN) 875-125 MG per tablet Take 1 tablet by mouth 2 (Two) Times a Day for 10 days. 7/6/23 7/16/23  Alanna Santos APRN   bacitracin 500 UNIT/GM ointment Apply 1 application  topically to the appropriate area as directed 2 (Two) Times a Day. 7/8/23   Kacie Crow APRN   ibuprofen (ADVIL,MOTRIN) 800 MG tablet Take 1 tablet by mouth Every 6 (Six) Hours As Needed for Mild Pain. 7/8/23   Kacie Crow APRN   sulfamethoxazole-trimethoprim (BACTRIM DS,SEPTRA DS) 800-160 MG per tablet Take 1 tablet by mouth 2 (Two) Times a Day for 14 days. 7/8/23 7/22/23  Kacie Crow  "KAN        Social History:   Social History     Tobacco Use    Smoking status: Every Day     Packs/day: 0.50     Years: 3.00     Pack years: 1.50     Types: Cigarettes    Smokeless tobacco: Never   Vaping Use    Vaping Use: Never used   Substance Use Topics    Alcohol use: Yes     Comment: occasional    Drug use: Not Currently     Types: Marijuana         Review of Systems:  Review of Systems   Constitutional:  Negative for fever.   Skin:  Positive for wound.      Physical Exam:  /90   Pulse 84   Temp 97.8 °F (36.6 °C) (Oral)   Resp 18   Ht 180.3 cm (71\")   Wt 91 kg (200 lb 9.9 oz)   SpO2 95%   BMI 27.98 kg/m²         Physical Exam  Vitals and nursing note reviewed.   Constitutional:       General: He is not in acute distress.     Appearance: Normal appearance. He is not ill-appearing.   HENT:      Head: Normocephalic and atraumatic.      Nose: Nose normal.      Mouth/Throat:      Mouth: Mucous membranes are moist.   Eyes:      Extraocular Movements: Extraocular movements intact.      Pupils: Pupils are equal, round, and reactive to light.   Cardiovascular:      Rate and Rhythm: Normal rate and regular rhythm.      Heart sounds: Normal heart sounds.   Pulmonary:      Effort: Pulmonary effort is normal. No respiratory distress.      Breath sounds: Normal breath sounds. No wheezing.   Chest:      Chest wall: No tenderness.   Abdominal:      General: Abdomen is flat.   Musculoskeletal:         General: Normal range of motion.      Cervical back: Normal range of motion and neck supple.   Skin:     General: Skin is warm and dry.      Findings: Abscess and erythema present.          Neurological:      General: No focal deficit present.      Mental Status: He is alert and oriented to person, place, and time.   Psychiatric:         Mood and Affect: Mood normal.         Behavior: Behavior normal.      .              Procedures:  Incision & Drainage    Date/Time: 7/11/2023 2:03 AM  Performed by: Shabbir, Yusra, " "PA  Authorized by: Braxton Coto DO     Consent:     Consent obtained:  Verbal    Consent given by:  Patient    Risks discussed:  Bleeding, incomplete drainage, pain and infection  Universal protocol:     Patient identity confirmed:  Verbally with patient  Location:     Type:  Abscess    Size:  3 cm    Location:  Upper extremity    Upper extremity location:  Arm    Arm location:  R upper arm  Pre-procedure details:     Skin preparation:  Povidone-iodine  Anesthesia:     Anesthesia method:  Local infiltration    Local anesthetic:  Lidocaine 1% WITH epi  Procedure type:     Complexity:  Simple  Procedure details:     Incision types:  Single straight    Wound management:  Irrigated with saline    Drainage:  Bloody and purulent    Drainage amount:  Moderate    Wound treatment:  Wound left open    Packing materials:  1/4 in iodoform gauze    Amount 1/4\" iodoform:  5cm  Post-procedure details:     Procedure completion:  Tolerated well, no immediate complications      Medical Decision Making:      Comorbidities that affect care:    None    External Notes reviewed:    Previous ED Note: Patient seen on 7/8/2023 for abscess of right axilla      The following orders were placed and all results were independently analyzed by me:  Orders Placed This Encounter   Procedures    Incision & Drainage    Blood Culture - Blood,    Blood Culture - Blood,    Wound Culture - Wound, Axilla, Right    Comprehensive Metabolic Panel    [place] Draw    Lactic Acid, Plasma    Sedimentation Rate    C-reactive Protein    CBC Auto Differential    STAT Lactic Acid, Reflex    Code Status and Medical Interventions:    Hospitalist (on-call MD unless specified)    Insert Peripheral IV    Inpatient Admission    CBC & Differential    Green Top (Gel)    Lavender Top    Gold Top - SST    Light Blue Top       Medications Given in the Emergency Department:  Medications   sodium chloride 0.9 % flush 10 mL (has no administration in time range)   sodium " chloride 0.9 % bolus 1,000 mL (1,000 mL Intravenous New Bag 7/11/23 0030)   ondansetron (ZOFRAN) injection 4 mg (4 mg Intravenous Given 7/11/23 0030)   HYDROmorphone (DILAUDID) injection 0.5 mg (0.5 mg Intravenous Given 7/11/23 0030)   ketorolac (TORADOL) injection 15 mg (15 mg Intravenous Given 7/11/23 0029)   piperacillin-tazobactam (ZOSYN) 3.375 g/100 mL 0.9% NS IVPB (mbp) (0 g Intravenous Stopped 7/11/23 0117)   vancomycin 1750 mg/500 mL 0.9% NS IVPB ([facility]) (1,750 mg Intravenous New Bag 7/11/23 0125)   potassium chloride (MICRO-K) CR capsule 40 mEq (40 mEq Oral Given 7/11/23 0127)        ED Course:    The patient was initially evaluated in the triage area where orders were placed. The patient was later dispositioned by Yusra Shabbir, PA.      The patient was advised to stay for completion of workup which includes but is not limited to communication of labs and radiological results, reassessment and plan. The patient was advised that leaving prior to disposition by a provider could result in critical findings that are not communicated to the patient.     ED Course as of 07/11/23 0245   Mon Jul 10, 2023   2350 Received notification from lab for lactic acid of 2.7 [AR]   Tue Jul 11, 2023   0124 Dr. Coto spoke with Dr. Huerta, who is agreeable to accept patient under his service for IV antibiotics [YS]      ED Course User Index  [AR] Elif Alexis, APRN  [YS] Shabbir, Yusra, PA       Labs:    Lab Results (last 24 hours)       Procedure Component Value Units Date/Time    CBC & Differential [[id number]]  (Normal) Collected: 07/10/23 2251    Specimen: Blood Updated: 07/10/23 2258    Narrative:      The following orders were created for panel order CBC & Differential.  Procedure                               Abnormality         Status                     ---------                               -----------         ------                     CBC Auto Differential[[id number]]        Normal              Final result                  Please view results for these tests on the individual orders.    Comprehensive Metabolic Panel [[id number]]  (Abnormal) Collected: 07/10/23 2251    Specimen: Blood Updated: 07/10/23 2329     Glucose 71 mg/dL      BUN 18 mg/dL      Creatinine 1.00 mg/dL      Sodium 139 mmol/L      Potassium 3.1 mmol/L      Chloride 101 mmol/L      CO2 23.8 mmol/L      Calcium 9.3 mg/dL      Total Protein 7.5 g/dL      Albumin 4.6 g/dL      ALT (SGPT) 21 U/L      AST (SGOT) 21 U/L      Alkaline Phosphatase 76 U/L      Total Bilirubin 0.2 mg/dL      Globulin 2.9 gm/dL      A/G Ratio 1.6 g/dL      BUN/Creatinine Ratio 18.0     Anion Gap 14.2 mmol/L      eGFR 107.1 mL/min/1.73     Narrative:      GFR Normal >60  Chronic Kidney Disease <60  Kidney Failure <15      Blood Culture - Blood, Arm, Left [[id number]] Collected: 07/10/23 2251    Specimen: Blood from Arm, Left Updated: 07/10/23 2254    Lactic Acid, Plasma [[id number]]  (Abnormal) Collected: 07/10/23 2251    Specimen: Blood Updated: 07/10/23 2342     Lactate 2.7 mmol/L     Sedimentation Rate [[id number]]  (Normal) Collected: 07/10/23 2251    Specimen: Blood Updated: 07/10/23 2305     Sed Rate 9 mm/hr     C-reactive Protein [[id number]]  (Abnormal) Collected: 07/10/23 2251    Specimen: Blood Updated: 07/10/23 2329     C-Reactive Protein 1.79 mg/dL     CBC Auto Differential [[id number]]  (Normal) Collected: 07/10/23 2251    Specimen: Blood Updated: 07/10/23 2258     WBC 7.14 10*3/mm3      RBC 4.65 10*6/mm3      Hemoglobin 13.7 g/dL      Hematocrit 40.4 %      MCV 86.9 fL      MCH 29.5 pg      MCHC 33.9 g/dL      RDW 12.6 %      RDW-SD 39.8 fl      MPV 9.8 fL      Platelets 254 10*3/mm3      Neutrophil % 52.7 %      Lymphocyte % 34.9 %      Monocyte % 9.4 %      Eosinophil % 2.4 %      Basophil % 0.3 %      Immature Grans % 0.3 %      Neutrophils, Absolute 3.77 10*3/mm3      Lymphocytes, Absolute 2.49 10*3/mm3      Monocytes, Absolute 0.67 10*3/mm3      Eosinophils, Absolute  0.17 10*3/mm3      Basophils, Absolute 0.02 10*3/mm3      Immature Grans, Absolute 0.02 10*3/mm3      nRBC 0.0 /100 WBC     Blood Culture - Blood, Arm, Left [[id number]] Collected: 07/11/23 0029    Specimen: Blood from Arm, Left Updated: 07/11/23 0045    Wound Culture - Wound, Axilla, Right [[id number]] Collected: 07/11/23 0044    Specimen: Wound from Axilla, Right Updated: 07/11/23 0044             Imaging:    No Radiology Exams Resulted Within Past 24 Hours      Differential Diagnosis and Discussion:      Abscess: Differential diagnosis for an abscess includes but is not limited to bacterial or fungal infections, foreign body reactions, malignancies, and autoimmune or inflammatory conditions.    All labs were reviewed and interpreted by me.    MDM  Number of Diagnoses or Management Options  Cellulitis of right arm  Cutaneous abscess of right axilla  Diagnosis management comments: Patient's vital signs within normal limits in the emergency department.  Patient CBC unremarkable and white count within normal limits.  Patient CMP remarkable for potassium of 3.1.  Potassium replaced in the emergency department.  Patient's sed rate within normal limits.  Patient C-reactive protein increased at 1.79.  Patient's lactic acid elevated at 2.7.  Patient's blood cultures are pending.  Patient was started on Zosyn and vancomycin in the emergency department.  Patient was given Toradol and Dilaudid for pain.  I&D was performed of abscess, moderate amount of purulent and bloody drainage was noted.  Wound culture was collected.  Patient discussed with Dr. Huerta, who is agreeable to accept patient under his service.       Amount and/or Complexity of Data Reviewed  Clinical lab tests: reviewed             Patient Care Considerations:          Consultants/Shared Management Plan:    Hospitalist: I have discussed the case with Dr. Huerta who agrees to accept the patient for admission.    Social Determinants of Health:    Patient is  independent, reliable, and has access to care.       Disposition and Care Coordination:    Admit:   Through independent evaluation of the patient's history, physical, and imperical data, the patient meets criteria for observation/admission to the hospital.        Final diagnoses:   Cutaneous abscess of right axilla   Cellulitis of right arm        ED Disposition       ED Disposition   Decision to Admit    Condition   --    Comment   Level of Care: Med/Surg [1]   Diagnosis: Abscess of axilla [[id number]]   Admitting Physician: KARYN RICHTER [[id number]]   Certification: I Certify That Inpatient Hospital Services Are Medically Necessary For Greater Than 2 Midnights                 This medical record created using voice recognition software.             Shabbir, Yusra, PA  07/11/23 7691

## 2023-07-11 NOTE — ED PROVIDER NOTES
"Patient is 23 y.o. year old male that presents to the ED for evaluation of right axillary swelling, pain and drainage..     Physical Exam there is a draining abscess to the right axilla with some erythema present.    ED Course:    /69 (BP Location: Left arm, Patient Position: Lying)   Pulse 58   Temp 97.8 °F (36.6 °C) (Oral)   Resp 18   Ht 180.3 cm (70.98\")   Wt 90.8 kg (200 lb 2.8 oz)   SpO2 100%   BMI 27.93 kg/m²   Results for orders placed or performed during the hospital encounter of 07/11/23   Blood Culture - Blood, Arm, Left    Specimen: Arm, Left; Blood   Result Value Ref Range    Blood Culture No growth at 24 hours    Blood Culture - Blood, Arm, Left    Specimen: Arm, Left; Blood   Result Value Ref Range    Blood Culture No growth at 24 hours    Wound Culture - Wound, Axilla, Right    Specimen: Axilla, Right; Wound   Result Value Ref Range    Gram Stain No organisms seen    Comprehensive Metabolic Panel    Specimen: Blood   Result Value Ref Range    Glucose 71 65 - 99 mg/dL    BUN 18 6 - 20 mg/dL    Creatinine 1.00 0.76 - 1.27 mg/dL    Sodium 139 136 - 145 mmol/L    Potassium 3.1 (L) 3.5 - 5.2 mmol/L    Chloride 101 98 - 107 mmol/L    CO2 23.8 22.0 - 29.0 mmol/L    Calcium 9.3 8.6 - 10.5 mg/dL    Total Protein 7.5 6.0 - 8.5 g/dL    Albumin 4.6 3.5 - 5.2 g/dL    ALT (SGPT) 21 1 - 41 U/L    AST (SGOT) 21 1 - 40 U/L    Alkaline Phosphatase 76 39 - 117 U/L    Total Bilirubin 0.2 0.0 - 1.2 mg/dL    Globulin 2.9 gm/dL    A/G Ratio 1.6 g/dL    BUN/Creatinine Ratio 18.0 7.0 - 25.0    Anion Gap 14.2 5.0 - 15.0 mmol/L    eGFR 107.1 >60.0 mL/min/1.73   Lactic Acid, Plasma    Specimen: Blood   Result Value Ref Range    Lactate 2.7 (C) 0.5 - 2.0 mmol/L   Sedimentation Rate    Specimen: Blood   Result Value Ref Range    Sed Rate 9 0 - 15 mm/hr   C-reactive Protein    Specimen: Blood   Result Value Ref Range    C-Reactive Protein 1.79 (H) 0.00 - 0.50 mg/dL   CBC Auto Differential    Specimen: Blood   Result " Value Ref Range    WBC 7.14 3.40 - 10.80 10*3/mm3    RBC 4.65 4.14 - 5.80 10*6/mm3    Hemoglobin 13.7 13.0 - 17.7 g/dL    Hematocrit 40.4 37.5 - 51.0 %    MCV 86.9 79.0 - 97.0 fL    MCH 29.5 26.6 - 33.0 pg    MCHC 33.9 31.5 - 35.7 g/dL    RDW 12.6 12.3 - 15.4 %    RDW-SD 39.8 37.0 - 54.0 fl    MPV 9.8 6.0 - 12.0 fL    Platelets 254 140 - 450 10*3/mm3    Neutrophil % 52.7 42.7 - 76.0 %    Lymphocyte % 34.9 19.6 - 45.3 %    Monocyte % 9.4 5.0 - 12.0 %    Eosinophil % 2.4 0.3 - 6.2 %    Basophil % 0.3 0.0 - 1.5 %    Immature Grans % 0.3 0.0 - 0.5 %    Neutrophils, Absolute 3.77 1.70 - 7.00 10*3/mm3    Lymphocytes, Absolute 2.49 0.70 - 3.10 10*3/mm3    Monocytes, Absolute 0.67 0.10 - 0.90 10*3/mm3    Eosinophils, Absolute 0.17 0.00 - 0.40 10*3/mm3    Basophils, Absolute 0.02 0.00 - 0.20 10*3/mm3    Immature Grans, Absolute 0.02 0.00 - 0.05 10*3/mm3    nRBC 0.0 0.0 - 0.2 /100 WBC   STAT Lactic Acid, Reflex    Specimen: Blood   Result Value Ref Range    Lactate 0.6 0.5 - 2.0 mmol/L   Renal Function Panel    Specimen: Blood   Result Value Ref Range    Glucose 96 65 - 99 mg/dL    BUN 16 6 - 20 mg/dL    Creatinine 0.88 0.76 - 1.27 mg/dL    Sodium 138 136 - 145 mmol/L    Potassium 3.8 3.5 - 5.2 mmol/L    Chloride 108 (H) 98 - 107 mmol/L    CO2 19.0 (L) 22.0 - 29.0 mmol/L    Calcium 8.3 (L) 8.6 - 10.5 mg/dL    Albumin 3.7 3.5 - 5.2 g/dL    Phosphorus 3.1 2.5 - 4.5 mg/dL    Anion Gap 11.0 5.0 - 15.0 mmol/L    BUN/Creatinine Ratio 18.2 7.0 - 25.0    eGFR 122.4 >60.0 mL/min/1.73   Green Top (Gel)   Result Value Ref Range    Extra Tube Hold for add-ons.    Lavender Top   Result Value Ref Range    Extra Tube hold for add-on    Gold Top - SST   Result Value Ref Range    Extra Tube Hold for add-ons.    Light Blue Top   Result Value Ref Range    Extra Tube Hold for add-ons.      Medications   sodium chloride 0.9 % flush 10 mL (has no administration in time range)   sodium chloride 0.9 % flush 10 mL (10 mL Intravenous Given 7/11/23  2116)   sodium chloride 0.9 % flush 10 mL (has no administration in time range)   sodium chloride 0.9 % infusion 40 mL (has no administration in time range)   sennosides-docusate (PERICOLACE) 8.6-50 MG per tablet 2 tablet (2 tablets Oral Given 7/11/23 2116)     And   polyethylene glycol (MIRALAX) packet 17 g (has no administration in time range)     And   bisacodyl (DULCOLAX) EC tablet 5 mg (has no administration in time range)     And   bisacodyl (DULCOLAX) suppository 10 mg (has no administration in time range)   heparin (porcine) 5000 UNIT/ML injection 5,000 Units (0 Units Subcutaneous Hold 7/12/23 0541)   nitroglycerin (NITROSTAT) SL tablet 0.4 mg (has no administration in time range)   HYDROcodone-acetaminophen (NORCO) 7.5-325 MG per tablet 1 tablet (1 tablet Oral Given 7/11/23 2226)   ondansetron (ZOFRAN) injection 4 mg (has no administration in time range)   Pharmacy to dose vancomycin (has no administration in time range)   nicotine (NICODERM CQ) 14 MG/24HR patch 1 patch (1 patch Transdermal Medication Removed 7/11/23 2356)   vancomycin IVPB 1500 mg in 0.9% NaCl (Premix) 500 mL (1,500 mg Intravenous New Bag 7/12/23 0043)   sodium chloride 0.9 % flush 10 mL (10 mL Intravenous Not Given 7/12/23 0115)   sodium chloride 0.9 % flush 10 mL (has no administration in time range)   sodium chloride 0.9 % infusion 40 mL (has no administration in time range)   lactated ringers infusion (50 mL/hr Intravenous New Bag 7/12/23 0044)   sodium chloride 0.9 % bolus 1,000 mL (0 mL Intravenous Stopped 7/11/23 0100)   ondansetron (ZOFRAN) injection 4 mg (4 mg Intravenous Given 7/11/23 0030)   HYDROmorphone (DILAUDID) injection 0.5 mg (0.5 mg Intravenous Given 7/11/23 0030)   ketorolac (TORADOL) injection 15 mg (15 mg Intravenous Given 7/11/23 0029)   piperacillin-tazobactam (ZOSYN) 3.375 g/100 mL 0.9% NS IVPB (mbp) (0 g Intravenous Stopped 7/11/23 0117)   vancomycin 1750 mg/500 mL 0.9% NS IVPB ([facility]) (1,750 mg Intravenous New Bag  7/11/23 0125)   potassium chloride (MICRO-K) CR capsule 40 mEq (40 mEq Oral Given 7/11/23 0127)   HYDROmorphone (DILAUDID) injection 0.5 mg (0.5 mg Intravenous Given 7/11/23 0928)     US Axilla Right    Result Date: 7/11/2023  Narrative: PROCEDURE: US AXILLA RIGHT  COMPARISON: None  INDICATIONS: Evaluate for fluid collection to help with operative planning, previous I&D, induration on exam  TECHNIQUE: A targeted breast ultrasound was performed, with evaluation focusing only on specific areas of concern.  FINDINGS: Images are labeled right axilla, region of interest.  There is extensive subcutaneous edema and ill-defined 1.5 x 0.4 x 0.6 cm fluid collection (which is not well loculated ) extending to the skin surface where there reportedly is an open a wound.  There are multiple tiny echogenic foci superficially which are likely foci of air given history and presence of the open wound in this area immediately adjacent to the fluid is a 7 mm long curvilinear echogenic shadowing focus is also present adjacent to some ill-defined fluid.  This could represent a radiopaque foreign body in the appropriate clinical scenario.        Impression: 1. There is extensive subcutaneous edema and a 1.5 x 0.4 x 0.6 cm non loculated fluid collection extending to the skin surface where there is an open abdominal wound.  A loculated fluid collection to indicate a well-formed abscess is not demonstrated. 2. Adjacent to the fluid is a 7 mm long curvilinear shadowing echogenic focus , potentially a retained foreign body.  Correlation with clinical scenario is needed.  Correlation with clinical scenario is needed.     OBDULIO FLORES DO       Electronically Signed and Approved By: OBDULIO FLORES DO on 7/11/2023 at 16:57              MDM:    Procedures      The case was discussed between the FLORA and myself. Patient  care including, but not limited to ordered imaging, medications, and lab results were reviewed. I then performed the substantive  portion of the visit including all aspects of the medical decision making.        Braxton Coto DO  07:09 EDT  07/12/23         Braxton Coto,   07/12/23 0709

## 2023-07-11 NOTE — H&P (VIEW-ONLY)
General Surgery/Colorectal Surgery Note    Patient Name:  Mat Middleton  YOB: 2000  [id number]    Referring Provider: No ref. provider found      Patient Care Team:  Canelo Bowers MD as PCP - General (Internal Medicine)    Chief complaint abscess    Subjective .     History of present illness:    1 week history of a worsening abscess to his right axilla.  Previous I&D in the emergency department.  Was given p.o. antibiotics.  No improvement.  Came back to the emergency department with pain to the right axilla for further evaluation.  No fever.  Admitted to the hospitalist.  Started on antibiotics.  No blood thinner use.  Work-up with WBC 7.  Denies history of previous abscess.  No known history of MRSA.  No blood thinner use.      History:  History reviewed. No pertinent past medical history.    Past Surgical History:   Procedure Laterality Date    ANKLE OPEN REDUCTION INTERNAL FIXATION Right     KNEE ACL RECONSTRUCTION Right     KNEE ARTHROSCOPY W/ MENISCAL REPAIR Right        Family History   Problem Relation Age of Onset    Hypertension Father     Diabetes Maternal Grandfather     Hypertension Maternal Grandfather     Diabetes Cousin        Social History     Tobacco Use    Smoking status: Every Day     Packs/day: 0.50     Years: 3.00     Pack years: 1.50     Types: Cigarettes    Smokeless tobacco: Never   Vaping Use    Vaping Use: Never used   Substance Use Topics    Alcohol use: Yes     Comment: occasional    Drug use: Not Currently     Types: Marijuana       Review of Systems  All systems were reviewed and negative except for:   Review of Systems   Constitutional: Negative for chills, fever and unexpected weight loss.   HENT: Negative for congestion, nosebleeds and voice change.    Eyes: Negative for blurred vision, double vision and discharge.   Respiratory: Negative for apnea, chest tightness and shortness of breath.    Cardiovascular: Negative for chest pain and leg swelling.    Gastrointestinal: Negative nausea vomit diarrhea abdominal pain   Endocrine: Negative for cold intolerance and heat intolerance.   Genitourinary: Negative for dysuria, hematuria and urgency.   Musculoskeletal: Negative for back pain, joint swelling and neck pain.   Skin: Negative for color change and dry skin.  See HPI  Neurological: Negative for dizziness and confusion.   Hematological: Negative for adenopathy.   Psychiatric/Behavioral: Negative for agitation and behavioral problems.     MEDS:  Prior to Admission medications    Medication Sig Start Date End Date Taking? Authorizing Provider   ibuprofen (ADVIL,MOTRIN) 800 MG tablet Take 1 tablet by mouth Every 6 (Six) Hours As Needed for Mild Pain. 7/8/23  Yes Kacie Crow APRN   mupirocin (BACTROBAN) 2 % ointment Apply 1 application  topically to the appropriate area as directed 3 (Three) Times a Day. 7/8/23  Yes Provider, MD Randa   amoxicillin-clavulanate (AUGMENTIN) 875-125 MG per tablet Take 1 tablet by mouth 2 (Two) Times a Day for 10 days. 7/6/23 7/11/23  Alanna Santos APRN   bacitracin 500 UNIT/GM ointment Apply 1 application  topically to the appropriate area as directed 2 (Two) Times a Day. 7/8/23 7/11/23  Kacie Crow APRN   sulfamethoxazole-trimethoprim (BACTRIM DS,SEPTRA DS) 800-160 MG per tablet Take 1 tablet by mouth 2 (Two) Times a Day for 14 days. 7/8/23 7/11/23  Kacie Crow APRN        heparin (porcine), 5,000 Units, Subcutaneous, Q8H  nicotine, 1 patch, Transdermal, Q24H  senna-docusate sodium, 2 tablet, Oral, BID  sodium chloride, 10 mL, Intravenous, Q12H  vancomycin, 1,500 mg, Intravenous, Q12H         Pharmacy to dose vancomycin,          Allergies:  Patient has no known allergies.    Objective     Vital Signs   Temp:  [97.4 °F (36.3 °C)-97.8 °F (36.6 °C)] 97.4 °F (36.3 °C)  Heart Rate:  [] 68  Resp:  [17-18] 18  BP: (121-142)/(65-90) 134/65    Physical Exam:     General Appearance:    Alert,  cooperative, in no acute distress   Head:    Normocephalic, without obvious abnormality, atraumatic   Eyes:          Conjunctivae and sclerae normal, no icterus,     Ears:    Ears appear intact with no abnormalities noted   Throat:   No oral lesions, no thrush, oral mucosa moist   Neck:   No adenopathy, supple, trachea midline, no thyromegaly   Back:     No kyphosis present, no scoliosis present, no skin lesions,      erythema or scars, no tenderness to percussion or                   palpation,   range of motion normal   Lungs:     Clear to auscultation,respirations regular, even and                  unlabored    Heart:    Regular rhythm and normal rate, normal S1 and S2, no            murmur, no gallop, no rub, no click   Chest Wall:    No abnormalities observed   Abdomen:     Normal bowel sounds, no masses, no organomegaly, soft        non-tender, non-distended, no guarding, no rebound                tenderness   Rectal:        Extremities:   Moves all extremities well, no edema, no cyanosis, no             redness   Pulses:   Pulses palpable and equal bilaterally   Skin:   No bleeding, bruising or rash, I&D site without fluctuance to the right axilla, some induration, minimal erythema, no crepitus   Lymph nodes:   No palpable adenopathy   Neurologic:   A/o x 4 with no deficits       Results Review: I have reviewed the patient's labs and imaging    LABS/IMAGING:    Imaging Results (Last 72 Hours)       ** No results found for the last 72 hours. **             Lab Results (last 72 hours)       Procedure Component Value Units Date/Time    Renal Function Panel [[id number]]  (Abnormal) Collected: 07/11/23 0518    Specimen: Blood Updated: 07/11/23 0635     Glucose 96 mg/dL      BUN 16 mg/dL      Creatinine 0.88 mg/dL      Sodium 138 mmol/L      Potassium 3.8 mmol/L      Chloride 108 mmol/L      CO2 19.0 mmol/L      Calcium 8.3 mg/dL      Albumin 3.7 g/dL      Phosphorus 3.1 mg/dL      Anion Gap 11.0 mmol/L       BUN/Creatinine Ratio 18.2     eGFR 122.4 mL/min/1.73     Narrative:      GFR Normal >60  Chronic Kidney Disease <60  Kidney Failure <15      STAT Lactic Acid, Reflex [[id number]]  (Normal) Collected: 07/11/23 0313    Specimen: Blood Updated: 07/11/23 0340     Lactate 0.6 mmol/L     Wound Culture - Wound, Axilla, Right [[id number]] Collected: 07/11/23 0044    Specimen: Wound from Axilla, Right Updated: 07/11/23 0250     Gram Stain No organisms seen    Blood Culture - Blood, Arm, Left [[id number]] Collected: 07/11/23 0029    Specimen: Blood from Arm, Left Updated: 07/11/23 0045    Lactic Acid, Plasma [[id number]]  (Abnormal) Collected: 07/10/23 2251    Specimen: Blood Updated: 07/10/23 2342     Lactate 2.7 mmol/L     Comprehensive Metabolic Panel [[id number]]  (Abnormal) Collected: 07/10/23 2251    Specimen: Blood Updated: 07/10/23 2329     Glucose 71 mg/dL      BUN 18 mg/dL      Creatinine 1.00 mg/dL      Sodium 139 mmol/L      Potassium 3.1 mmol/L      Chloride 101 mmol/L      CO2 23.8 mmol/L      Calcium 9.3 mg/dL      Total Protein 7.5 g/dL      Albumin 4.6 g/dL      ALT (SGPT) 21 U/L      AST (SGOT) 21 U/L      Alkaline Phosphatase 76 U/L      Total Bilirubin 0.2 mg/dL      Globulin 2.9 gm/dL      A/G Ratio 1.6 g/dL      BUN/Creatinine Ratio 18.0     Anion Gap 14.2 mmol/L      eGFR 107.1 mL/min/1.73     Narrative:      GFR Normal >60  Chronic Kidney Disease <60  Kidney Failure <15      C-reactive Protein [[id number]]  (Abnormal) Collected: 07/10/23 2251    Specimen: Blood Updated: 07/10/23 2329     C-Reactive Protein 1.79 mg/dL     Sedimentation Rate [[id number]]  (Normal) Collected: 07/10/23 2251    Specimen: Blood Updated: 07/10/23 2305     Sed Rate 9 mm/hr     [place] Draw [[id number]] Collected: 07/10/23 2251    Specimen: Blood Updated: 07/10/23 2258    Narrative:      The following orders were created for panel order [place] Draw.  Procedure                               Abnormality         Status                      ---------                               -----------         ------                     Green Top (Gel)[[id number]]                                  Final result               Lavender Top[[id number]]                                     Final result               Gold Top - SST[[id number]]                                   Final result               Light Blue Top[[id number]]                                   Final result                 Please view results for these tests on the individual orders.    Green Top (Gel) [[id number]] Collected: 07/10/23 2251    Specimen: Blood Updated: 07/10/23 2258     Extra Tube Hold for add-ons.     Comment: Auto resulted.       Lavender Top [[id number]] Collected: 07/10/23 2251    Specimen: Blood Updated: 07/10/23 2258     Extra Tube hold for add-on     Comment: Auto resulted       Gold Top - SST [[id number]] Collected: 07/10/23 2251    Specimen: Blood Updated: 07/10/23 2258     Extra Tube Hold for add-ons.     Comment: Auto resulted.       Light Blue Top [[id number]] Collected: 07/10/23 2251    Specimen: Blood Updated: 07/10/23 2258     Extra Tube Hold for add-ons.     Comment: Auto resulted       CBC & Differential [[id number]]  (Normal) Collected: 07/10/23 2251    Specimen: Blood Updated: 07/10/23 2258    Narrative:      The following orders were created for panel order CBC & Differential.  Procedure                               Abnormality         Status                     ---------                               -----------         ------                     CBC Auto Differential[[id number]]        Normal              Final result                 Please view results for these tests on the individual orders.    CBC Auto Differential [[id number]]  (Normal) Collected: 07/10/23 2251    Specimen: Blood Updated: 07/10/23 2258     WBC 7.14 10*3/mm3      RBC 4.65 10*6/mm3      Hemoglobin 13.7 g/dL      Hematocrit 40.4 %      MCV 86.9 fL      MCH 29.5 pg      MCHC 33.9 g/dL      RDW 12.6 %       RDW-SD 39.8 fl      MPV 9.8 fL      Platelets 254 10*3/mm3      Neutrophil % 52.7 %      Lymphocyte % 34.9 %      Monocyte % 9.4 %      Eosinophil % 2.4 %      Basophil % 0.3 %      Immature Grans % 0.3 %      Neutrophils, Absolute 3.77 10*3/mm3      Lymphocytes, Absolute 2.49 10*3/mm3      Monocytes, Absolute 0.67 10*3/mm3      Eosinophils, Absolute 0.17 10*3/mm3      Basophils, Absolute 0.02 10*3/mm3      Immature Grans, Absolute 0.02 10*3/mm3      nRBC 0.0 /100 WBC     Blood Culture - Blood, Arm, Left [[id number]] Collected: 07/10/23 2251    Specimen: Blood from Arm, Left Updated: 07/10/23 2254               Result Review :     Assessment & Plan     Abscess of axilla     Right axillary abscess status post I&D by ER provider  Right axillary cellulitis, no evidence of necrotizing soft tissue infection    I have reviewed the patient's work-up with the labs mentioned above.  I will obtain a stat ultrasound of his right axilla to evaluate for fluid collection.  If there is a fluid collection we will proceed with incision and drainage of right axillary abscess tomorrow.  If no fluid collection then recommend treating with antibiotics.  No packing needed from my standpoint.  Dry gauze dressing daily.  Continue antibiotics.  N.p.o. after midnight.  All of his questions were answered.  He agrees with the plan.        Edgardo Forbes MD  07/11/23 15:30 EDT

## 2023-07-11 NOTE — PROGRESS NOTES
"[facility]: Vancomycin Monitoring Note    Mat Middleton is a 23 y.o. male who is on day 2 of pharmacy to dose vancomycin for Skin and Soft Tissue.    Previous Vancomycin Dose:   1500 mg IV every  12  hours  Imaging Reviewed?: N/A  Updated Cultures and Sensitivities:   23: WOUND CX, RIGHT AXILLA: NO ORGANISMS SEEN  23: WOUND CX, RIGHT AXILLA: NRSA    Vitals/Labs  Ht: 180.3 cm (70.98\"); Wt: 90.8 kg (200 lb 2.8 oz)     Temp (24hrs), Av.7 °F (36.5 °C), Min:97.5 °F (36.4 °C), Max:97.8 °F (36.6 °C)    Estimated Creatinine Clearance: 147.9 mL/min (by C-G formula based on SCr of 0.88 mg/dL).       Results from last 7 days   Lab Units 23  0518 07/10/23  2251 23  1851   CREATININE mg/dL 0.88 1.00 0.89   WBC 10*3/mm3  --  7.14 12.85*     Assessment/Plan    Current Vancomycin Dose:  1500 mg IV every 12 hours; which provides the following predicted parameters:  AUC24,ss: 553 mg/L.hr  PAUC*: 80 %  Ctrough,ss: 16.5 mg/L  Pconc*: 36 %  Tox.: 12 %  Next Vanc Trough ordered for tomorrow at 1230  We will continue to monitor patient changes and renal function     Thank you for involving pharmacy in this patient's care. Please contact pharmacy with any questions or concerns.    Oralia Hancock  Clinical Pharmacist    "

## 2023-07-11 NOTE — SIGNIFICANT NOTE
Wound Eval / Progress Noted    KEVIN Castro     Patient Name: Mat Middleton  : 2000  MRN: [id number]  Today's Date: 2023                 Admit Date: 2023    Visit Dx:    ICD-10-CM ICD-9-CM   1. Cutaneous abscess of right axilla  L02.411 682.3   2. Cellulitis of right arm  L03.113 682.3         Abscess of axilla        History reviewed. No pertinent past medical history.     Past Surgical History:   Procedure Laterality Date    ANKLE OPEN REDUCTION INTERNAL FIXATION Right     KNEE ACL RECONSTRUCTION Right     KNEE ARTHROSCOPY W/ MENISCAL REPAIR Right          Physical Assessment:  Wound Right mid axillary (Active)   Wound Image   23 0351   Dressing Appearance intact;moist drainage 23   Closure None 23   Base moist;red 23   Periwound dry;intact;redness 23   Periwound Temperature warm 23   Periwound Skin Turgor soft 23   Edges open 23   Wound Length (cm) 1.2 cm 23   Wound Width (cm) 1.7 cm 23   Wound Depth (cm) 1.2 cm 23   Wound Surface Area (cm^2) 2.04 cm^2 23   Wound Volume (cm^3) 2.448 cm^3 23   Drainage Characteristics/Odor serosanguineous;sanguineous 23   Drainage Amount small 23   Care, Wound cleansed with;irrigated with;sterile normal saline 23   Dressing Care dressing applied;packed with;packing strip;gauze, iodoform;silicone;border dressing 23   Periwound Care absorptive dressing applied 23      Wound Check / Follow-up:  Patient seen today for wound consult. Patient presented to ED last night for evaluation of right axillary abscess. I&D was performed in the ED. Dressing intact with moist drainage. Dressing and packing removed. Wound base is red and moist. There is redness to periwound tissue. Cleansed and irrigated with normal saline. Patient unable to remain still during dressing change and is  voicing complaints of severe pain. He was medicated with PO pain medication prior to my arrival. He states he cannot endure dressing change. Primary RN notified attending physician who ordered one time dose of IV Dilaudid. Filled wound bed with 1/4 inch iodoform packing strip and secured with silicone border dressing. Recommending BID dressing changes.      Impression: Right axillary abscess.      Short term goals:  Regain skin integrity, BID dressing changes, skin protection.      Trudi Pressley RN    7/11/2023    13:38 EDT

## 2023-07-11 NOTE — CONSULTS
General Surgery/Colorectal Surgery Note    Patient Name:  Mat Middleton  YOB: 2000  [id number]    Referring Provider: No ref. provider found      Patient Care Team:  Canelo Bowers MD as PCP - General (Internal Medicine)    Chief complaint abscess    Subjective .     History of present illness:    1 week history of a worsening abscess to his right axilla.  Previous I&D in the emergency department.  Was given p.o. antibiotics.  No improvement.  Came back to the emergency department with pain to the right axilla for further evaluation.  No fever.  Admitted to the hospitalist.  Started on antibiotics.  No blood thinner use.  Work-up with WBC 7.  Denies history of previous abscess.  No known history of MRSA.  No blood thinner use.      History:  History reviewed. No pertinent past medical history.    Past Surgical History:   Procedure Laterality Date    ANKLE OPEN REDUCTION INTERNAL FIXATION Right     KNEE ACL RECONSTRUCTION Right     KNEE ARTHROSCOPY W/ MENISCAL REPAIR Right        Family History   Problem Relation Age of Onset    Hypertension Father     Diabetes Maternal Grandfather     Hypertension Maternal Grandfather     Diabetes Cousin        Social History     Tobacco Use    Smoking status: Every Day     Packs/day: 0.50     Years: 3.00     Pack years: 1.50     Types: Cigarettes    Smokeless tobacco: Never   Vaping Use    Vaping Use: Never used   Substance Use Topics    Alcohol use: Yes     Comment: occasional    Drug use: Not Currently     Types: Marijuana       Review of Systems  All systems were reviewed and negative except for:   Review of Systems   Constitutional: Negative for chills, fever and unexpected weight loss.   HENT: Negative for congestion, nosebleeds and voice change.    Eyes: Negative for blurred vision, double vision and discharge.   Respiratory: Negative for apnea, chest tightness and shortness of breath.    Cardiovascular: Negative for chest pain and leg swelling.    Gastrointestinal: Negative nausea vomit diarrhea abdominal pain   Endocrine: Negative for cold intolerance and heat intolerance.   Genitourinary: Negative for dysuria, hematuria and urgency.   Musculoskeletal: Negative for back pain, joint swelling and neck pain.   Skin: Negative for color change and dry skin.  See HPI  Neurological: Negative for dizziness and confusion.   Hematological: Negative for adenopathy.   Psychiatric/Behavioral: Negative for agitation and behavioral problems.     MEDS:  Prior to Admission medications    Medication Sig Start Date End Date Taking? Authorizing Provider   ibuprofen (ADVIL,MOTRIN) 800 MG tablet Take 1 tablet by mouth Every 6 (Six) Hours As Needed for Mild Pain. 7/8/23  Yes Kacie Crow APRN   mupirocin (BACTROBAN) 2 % ointment Apply 1 application  topically to the appropriate area as directed 3 (Three) Times a Day. 7/8/23  Yes Provider, MD Randa   amoxicillin-clavulanate (AUGMENTIN) 875-125 MG per tablet Take 1 tablet by mouth 2 (Two) Times a Day for 10 days. 7/6/23 7/11/23  Alanna Santos APRN   bacitracin 500 UNIT/GM ointment Apply 1 application  topically to the appropriate area as directed 2 (Two) Times a Day. 7/8/23 7/11/23  Kacie Crow APRN   sulfamethoxazole-trimethoprim (BACTRIM DS,SEPTRA DS) 800-160 MG per tablet Take 1 tablet by mouth 2 (Two) Times a Day for 14 days. 7/8/23 7/11/23  Kacie Crow APRN        heparin (porcine), 5,000 Units, Subcutaneous, Q8H  nicotine, 1 patch, Transdermal, Q24H  senna-docusate sodium, 2 tablet, Oral, BID  sodium chloride, 10 mL, Intravenous, Q12H  vancomycin, 1,500 mg, Intravenous, Q12H         Pharmacy to dose vancomycin,          Allergies:  Patient has no known allergies.    Objective     Vital Signs   Temp:  [97.4 °F (36.3 °C)-97.8 °F (36.6 °C)] 97.4 °F (36.3 °C)  Heart Rate:  [] 68  Resp:  [17-18] 18  BP: (121-142)/(65-90) 134/65    Physical Exam:     General Appearance:    Alert,  cooperative, in no acute distress   Head:    Normocephalic, without obvious abnormality, atraumatic   Eyes:          Conjunctivae and sclerae normal, no icterus,     Ears:    Ears appear intact with no abnormalities noted   Throat:   No oral lesions, no thrush, oral mucosa moist   Neck:   No adenopathy, supple, trachea midline, no thyromegaly   Back:     No kyphosis present, no scoliosis present, no skin lesions,      erythema or scars, no tenderness to percussion or                   palpation,   range of motion normal   Lungs:     Clear to auscultation,respirations regular, even and                  unlabored    Heart:    Regular rhythm and normal rate, normal S1 and S2, no            murmur, no gallop, no rub, no click   Chest Wall:    No abnormalities observed   Abdomen:     Normal bowel sounds, no masses, no organomegaly, soft        non-tender, non-distended, no guarding, no rebound                tenderness   Rectal:        Extremities:   Moves all extremities well, no edema, no cyanosis, no             redness   Pulses:   Pulses palpable and equal bilaterally   Skin:   No bleeding, bruising or rash, I&D site without fluctuance to the right axilla, some induration, minimal erythema, no crepitus   Lymph nodes:   No palpable adenopathy   Neurologic:   A/o x 4 with no deficits       Results Review: I have reviewed the patient's labs and imaging    LABS/IMAGING:    Imaging Results (Last 72 Hours)       ** No results found for the last 72 hours. **             Lab Results (last 72 hours)       Procedure Component Value Units Date/Time    Renal Function Panel [[id number]]  (Abnormal) Collected: 07/11/23 0518    Specimen: Blood Updated: 07/11/23 0635     Glucose 96 mg/dL      BUN 16 mg/dL      Creatinine 0.88 mg/dL      Sodium 138 mmol/L      Potassium 3.8 mmol/L      Chloride 108 mmol/L      CO2 19.0 mmol/L      Calcium 8.3 mg/dL      Albumin 3.7 g/dL      Phosphorus 3.1 mg/dL      Anion Gap 11.0 mmol/L       BUN/Creatinine Ratio 18.2     eGFR 122.4 mL/min/1.73     Narrative:      GFR Normal >60  Chronic Kidney Disease <60  Kidney Failure <15      STAT Lactic Acid, Reflex [[id number]]  (Normal) Collected: 07/11/23 0313    Specimen: Blood Updated: 07/11/23 0340     Lactate 0.6 mmol/L     Wound Culture - Wound, Axilla, Right [[id number]] Collected: 07/11/23 0044    Specimen: Wound from Axilla, Right Updated: 07/11/23 0250     Gram Stain No organisms seen    Blood Culture - Blood, Arm, Left [[id number]] Collected: 07/11/23 0029    Specimen: Blood from Arm, Left Updated: 07/11/23 0045    Lactic Acid, Plasma [[id number]]  (Abnormal) Collected: 07/10/23 2251    Specimen: Blood Updated: 07/10/23 2342     Lactate 2.7 mmol/L     Comprehensive Metabolic Panel [[id number]]  (Abnormal) Collected: 07/10/23 2251    Specimen: Blood Updated: 07/10/23 2329     Glucose 71 mg/dL      BUN 18 mg/dL      Creatinine 1.00 mg/dL      Sodium 139 mmol/L      Potassium 3.1 mmol/L      Chloride 101 mmol/L      CO2 23.8 mmol/L      Calcium 9.3 mg/dL      Total Protein 7.5 g/dL      Albumin 4.6 g/dL      ALT (SGPT) 21 U/L      AST (SGOT) 21 U/L      Alkaline Phosphatase 76 U/L      Total Bilirubin 0.2 mg/dL      Globulin 2.9 gm/dL      A/G Ratio 1.6 g/dL      BUN/Creatinine Ratio 18.0     Anion Gap 14.2 mmol/L      eGFR 107.1 mL/min/1.73     Narrative:      GFR Normal >60  Chronic Kidney Disease <60  Kidney Failure <15      C-reactive Protein [[id number]]  (Abnormal) Collected: 07/10/23 2251    Specimen: Blood Updated: 07/10/23 2329     C-Reactive Protein 1.79 mg/dL     Sedimentation Rate [[id number]]  (Normal) Collected: 07/10/23 2251    Specimen: Blood Updated: 07/10/23 2305     Sed Rate 9 mm/hr     [place] Draw [[id number]] Collected: 07/10/23 2251    Specimen: Blood Updated: 07/10/23 2258    Narrative:      The following orders were created for panel order [place] Draw.  Procedure                               Abnormality         Status                      ---------                               -----------         ------                     Green Top (Gel)[[id number]]                                  Final result               Lavender Top[[id number]]                                     Final result               Gold Top - SST[[id number]]                                   Final result               Light Blue Top[[id number]]                                   Final result                 Please view results for these tests on the individual orders.    Green Top (Gel) [[id number]] Collected: 07/10/23 2251    Specimen: Blood Updated: 07/10/23 2258     Extra Tube Hold for add-ons.     Comment: Auto resulted.       Lavender Top [[id number]] Collected: 07/10/23 2251    Specimen: Blood Updated: 07/10/23 2258     Extra Tube hold for add-on     Comment: Auto resulted       Gold Top - SST [[id number]] Collected: 07/10/23 2251    Specimen: Blood Updated: 07/10/23 2258     Extra Tube Hold for add-ons.     Comment: Auto resulted.       Light Blue Top [[id number]] Collected: 07/10/23 2251    Specimen: Blood Updated: 07/10/23 2258     Extra Tube Hold for add-ons.     Comment: Auto resulted       CBC & Differential [[id number]]  (Normal) Collected: 07/10/23 2251    Specimen: Blood Updated: 07/10/23 2258    Narrative:      The following orders were created for panel order CBC & Differential.  Procedure                               Abnormality         Status                     ---------                               -----------         ------                     CBC Auto Differential[[id number]]        Normal              Final result                 Please view results for these tests on the individual orders.    CBC Auto Differential [[id number]]  (Normal) Collected: 07/10/23 2251    Specimen: Blood Updated: 07/10/23 2258     WBC 7.14 10*3/mm3      RBC 4.65 10*6/mm3      Hemoglobin 13.7 g/dL      Hematocrit 40.4 %      MCV 86.9 fL      MCH 29.5 pg      MCHC 33.9 g/dL      RDW 12.6 %       RDW-SD 39.8 fl      MPV 9.8 fL      Platelets 254 10*3/mm3      Neutrophil % 52.7 %      Lymphocyte % 34.9 %      Monocyte % 9.4 %      Eosinophil % 2.4 %      Basophil % 0.3 %      Immature Grans % 0.3 %      Neutrophils, Absolute 3.77 10*3/mm3      Lymphocytes, Absolute 2.49 10*3/mm3      Monocytes, Absolute 0.67 10*3/mm3      Eosinophils, Absolute 0.17 10*3/mm3      Basophils, Absolute 0.02 10*3/mm3      Immature Grans, Absolute 0.02 10*3/mm3      nRBC 0.0 /100 WBC     Blood Culture - Blood, Arm, Left [[id number]] Collected: 07/10/23 2251    Specimen: Blood from Arm, Left Updated: 07/10/23 2254               Result Review :     Assessment & Plan     Abscess of axilla     Right axillary abscess status post I&D by ER provider  Right axillary cellulitis, no evidence of necrotizing soft tissue infection    I have reviewed the patient's work-up with the labs mentioned above.  I will obtain a stat ultrasound of his right axilla to evaluate for fluid collection.  If there is a fluid collection we will proceed with incision and drainage of right axillary abscess tomorrow.  If no fluid collection then recommend treating with antibiotics.  No packing needed from my standpoint.  Dry gauze dressing daily.  Continue antibiotics.  N.p.o. after midnight.  All of his questions were answered.  He agrees with the plan.        Edgardo Forbes MD  07/11/23 15:30 EDT

## 2023-07-11 NOTE — PLAN OF CARE
Goal Outcome Evaluation:      New admit from ED this shift. PT in contact isolation d/t MRSA of wound in right axilla. Will continue POC.

## 2023-07-11 NOTE — NURSING NOTE
Pt transferring to [facility] to room 373. Report called to Diana FALCON.

## 2023-07-12 PROBLEM — L03.113 CELLULITIS OF RIGHT UPPER EXTREMITY: Status: ACTIVE | Noted: 2023-07-12

## 2023-07-12 LAB — VANCOMYCIN TROUGH SERPL-MCNC: 30.6 MCG/ML (ref 5–20)

## 2023-07-12 PROCEDURE — 25010000002 VANCOMYCIN 5 G RECONSTITUTED SOLUTION: Performed by: SURGERY

## 2023-07-12 PROCEDURE — 10060 I&D ABSCESS SIMPLE/SINGLE: CPT | Performed by: SURGERY

## 2023-07-12 PROCEDURE — 25010000002 DEXAMETHASONE SODIUM PHOSPHATE 100 MG/10ML SOLUTION: Performed by: SURGERY

## 2023-07-12 PROCEDURE — 25010000002 HEPARIN (PORCINE) PER 1000 UNITS: Performed by: SURGERY

## 2023-07-12 PROCEDURE — 25010000002 HYDROMORPHONE 1 MG/ML SOLUTION: Performed by: ANESTHESIOLOGY

## 2023-07-12 PROCEDURE — 80202 ASSAY OF VANCOMYCIN: CPT | Performed by: SURGERY

## 2023-07-12 PROCEDURE — 0 BUPIVACAINE (PF) 0.25 % SOLUTION: Performed by: SURGERY

## 2023-07-12 PROCEDURE — 25010000002 VANCOMYCIN 5 G RECONSTITUTED SOLUTION: Performed by: BEHAVIOR TECHNICIAN

## 2023-07-12 PROCEDURE — 25010000002 MORPHINE PER 10 MG: Performed by: INTERNAL MEDICINE

## 2023-07-12 PROCEDURE — 0J9D00Z DRAINAGE OF RIGHT UPPER ARM SUBCUTANEOUS TISSUE AND FASCIA WITH DRAINAGE DEVICE, OPEN APPROACH: ICD-10-PCS | Performed by: SURGERY

## 2023-07-12 PROCEDURE — 25010000002 BUPRENORPHINE PER 0.1 MG: Performed by: SURGERY

## 2023-07-12 RX ORDER — PROMETHAZINE HYDROCHLORIDE 25 MG/1
25 SUPPOSITORY RECTAL ONCE AS NEEDED
Status: DISCONTINUED | OUTPATIENT
Start: 2023-07-12 | End: 2023-07-12 | Stop reason: HOSPADM

## 2023-07-12 RX ORDER — MEPERIDINE HYDROCHLORIDE 25 MG/ML
12.5 INJECTION INTRAMUSCULAR; INTRAVENOUS; SUBCUTANEOUS
Status: DISCONTINUED | OUTPATIENT
Start: 2023-07-12 | End: 2023-07-12 | Stop reason: HOSPADM

## 2023-07-12 RX ORDER — SODIUM CHLORIDE 0.9 % (FLUSH) 0.9 %
10 SYRINGE (ML) INJECTION EVERY 12 HOURS SCHEDULED
Status: DISCONTINUED | OUTPATIENT
Start: 2023-07-12 | End: 2023-07-12 | Stop reason: HOSPADM

## 2023-07-12 RX ORDER — SODIUM CHLORIDE 0.9 % (FLUSH) 0.9 %
10 SYRINGE (ML) INJECTION AS NEEDED
Status: DISCONTINUED | OUTPATIENT
Start: 2023-07-12 | End: 2023-07-12 | Stop reason: HOSPADM

## 2023-07-12 RX ORDER — OXYCODONE HCL 5 MG/5 ML
5 SOLUTION, ORAL ORAL EVERY 4 HOURS PRN
Status: DISCONTINUED | OUTPATIENT
Start: 2023-07-12 | End: 2023-07-12 | Stop reason: HOSPADM

## 2023-07-12 RX ORDER — ONDANSETRON 2 MG/ML
4 INJECTION INTRAMUSCULAR; INTRAVENOUS ONCE AS NEEDED
Status: DISCONTINUED | OUTPATIENT
Start: 2023-07-12 | End: 2023-07-12 | Stop reason: HOSPADM

## 2023-07-12 RX ORDER — PROMETHAZINE HYDROCHLORIDE 12.5 MG/1
25 TABLET ORAL ONCE AS NEEDED
Status: DISCONTINUED | OUTPATIENT
Start: 2023-07-12 | End: 2023-07-12 | Stop reason: HOSPADM

## 2023-07-12 RX ORDER — MAGNESIUM HYDROXIDE 1200 MG/15ML
LIQUID ORAL AS NEEDED
Status: DISCONTINUED | OUTPATIENT
Start: 2023-07-12 | End: 2023-07-12 | Stop reason: HOSPADM

## 2023-07-12 RX ORDER — SODIUM CHLORIDE, SODIUM LACTATE, POTASSIUM CHLORIDE, CALCIUM CHLORIDE 600; 310; 30; 20 MG/100ML; MG/100ML; MG/100ML; MG/100ML
50 INJECTION, SOLUTION INTRAVENOUS CONTINUOUS
Status: DISCONTINUED | OUTPATIENT
Start: 2023-07-12 | End: 2023-07-13

## 2023-07-12 RX ORDER — SODIUM CHLORIDE 9 MG/ML
40 INJECTION, SOLUTION INTRAVENOUS AS NEEDED
Status: DISCONTINUED | OUTPATIENT
Start: 2023-07-12 | End: 2023-07-12 | Stop reason: HOSPADM

## 2023-07-12 RX ORDER — MORPHINE SULFATE 2 MG/ML
2 INJECTION, SOLUTION INTRAMUSCULAR; INTRAVENOUS
Status: DISCONTINUED | OUTPATIENT
Start: 2023-07-12 | End: 2023-07-14 | Stop reason: HOSPADM

## 2023-07-12 RX ORDER — VANCOMYCIN/0.9 % SOD CHLORIDE 1.5G/250ML
1500 PLASTIC BAG, INJECTION (ML) INTRAVENOUS EVERY 12 HOURS
Status: DISCONTINUED | OUTPATIENT
Start: 2023-07-13 | End: 2023-07-14 | Stop reason: HOSPADM

## 2023-07-12 RX ADMIN — VANCOMYCIN HYDROCHLORIDE 1500 MG: 5 INJECTION, POWDER, LYOPHILIZED, FOR SOLUTION INTRAVENOUS at 00:43

## 2023-07-12 RX ADMIN — HYDROCODONE BITARTRATE AND ACETAMINOPHEN 1 TABLET: 7.5; 325 TABLET ORAL at 16:42

## 2023-07-12 RX ADMIN — NICOTINE 1 PATCH: 14 PATCH, EXTENDED RELEASE TRANSDERMAL at 16:43

## 2023-07-12 RX ADMIN — MORPHINE SULFATE 2 MG: 2 INJECTION, SOLUTION INTRAMUSCULAR; INTRAVENOUS at 23:22

## 2023-07-12 RX ADMIN — OXYCODONE HYDROCHLORIDE 5 MG: 5 SOLUTION ORAL at 13:04

## 2023-07-12 RX ADMIN — SENNOSIDES AND DOCUSATE SODIUM 2 TABLET: 50; 8.6 TABLET ORAL at 23:29

## 2023-07-12 RX ADMIN — HYDROMORPHONE HYDROCHLORIDE 0.5 MG: 1 INJECTION, SOLUTION INTRAMUSCULAR; INTRAVENOUS; SUBCUTANEOUS at 12:59

## 2023-07-12 RX ADMIN — MORPHINE SULFATE 2 MG: 2 INJECTION, SOLUTION INTRAMUSCULAR; INTRAVENOUS at 20:06

## 2023-07-12 RX ADMIN — SODIUM CHLORIDE, POTASSIUM CHLORIDE, SODIUM LACTATE AND CALCIUM CHLORIDE 50 ML/HR: 600; 310; 30; 20 INJECTION, SOLUTION INTRAVENOUS at 00:44

## 2023-07-12 RX ADMIN — HYDROMORPHONE HYDROCHLORIDE 0.5 MG: 1 INJECTION, SOLUTION INTRAMUSCULAR; INTRAVENOUS; SUBCUTANEOUS at 13:09

## 2023-07-12 RX ADMIN — Medication 10 ML: at 23:30

## 2023-07-12 RX ADMIN — HEPARIN SODIUM 5000 UNITS: 5000 INJECTION INTRAVENOUS; SUBCUTANEOUS at 23:21

## 2023-07-12 RX ADMIN — HEPARIN SODIUM 5000 UNITS: 5000 INJECTION INTRAVENOUS; SUBCUTANEOUS at 14:41

## 2023-07-12 NOTE — NURSING NOTE
Patient alert and oriented x4. Patient waiting for transport for surgery. No complaints at that time.     Report done to 5th floor at 1330.

## 2023-07-12 NOTE — OP NOTE
OP NOTE  INCISION AND DRAINAGE ABSCESS  Procedure Report    Patient Name:  Mat Middleton  YOB: 2000  [id number]    Date of Surgery:  7/12/2023     Indications: See consult note for indications, discussion risk benefits and alternatives    Pre-op Diagnosis:   Abscess of axilla [L02.419]       Post-Op Diagnosis Codes:     * Abscess of axilla [L02.419]    Procedure/CPT® Codes:      Procedure(s):  INCISION AND DRAINAGE ABSCESS right axilla    Staff:  Surgeon(s):  Edgardo Forbes MD    Assistant: Linda Diaz CSA    Anesthesia: General, Local    Estimated Blood Loss: minimal    Implants:    Nothing was implanted during the procedure    Specimen:          Specimens       ID Source Type Tests Collected By Collected At Frozen?    1 Axilla, Right Wound ANAEROBIC CULTURE  WOUND CULTURE   Edgardo Forbes MD 7/12/23 1154     Description: Right Axilla Wound Culture    This specimen was not marked as sent.              Findings: Minimal purulence to right axilla abscess with no evidence of necrotizing soft tissue infection.  Counterincision made with Penrose placed to help allow drainage.    Complications: None    Description of Procedure:   After all questions were answered, consent was verified, site marked.  Patient brought to operative room per stretcher placed in supine position arms and all extremities padded.  Bilateral lower extremity SCDs placed.  Anesthesia induced.  Preoperative IV antibiotics given.  Patient's right axilla prepped with ChloraPrep.  We waited 3 minutes.  We draped in usual sterile fashion.  Critical timeout taken.  Began the procedure with examination the previous I&D site to the right axilla.  No significant fluctuance.  In the area of most induration lateral to the previous I&D site I made a counterincision.  I then probed this area with minimal purulence drained.  No evidence of necrotizing soft tissue infection.  I communicated the 2 openings with a  Penrose to allow drainage.  I irrigated.  I then placed a 4 x 4 gauze dressing in the I&D site.  I infiltrated with local anesthesia.  Dressing placed.  At the end of the procedure all counts were correct.  I was present for the procedure.    Assistant: Linda Diaz CSA was responsible for performing the following activities: Suction, Irrigation, and Placing Dressing and their skilled assistance was necessary for the success of this case.    Edgardo Forbes MD     Date: 7/12/2023  Time: 12:08 EDT

## 2023-07-12 NOTE — PLAN OF CARE
Goal Outcome Evaluation:      Patient is A&Ox4. VSS. Medicated for pain on shift, see MAR. Patient remained NPO since midnight. No complaints from patient at this time. Will continue to monitor.

## 2023-07-12 NOTE — INTERVAL H&P NOTE
H&P reviewed. The patient was examined and there are no changes to the H&P.      Ultrasound of right axilla with concern for ongoing abscess.  I&D today.  All of his questions answered.

## 2023-07-12 NOTE — PROGRESS NOTES
[facility]     Progress Note    Patient Name: Mat Middleton  : 2000  MRN: [id number]  Primary Care Physician:  Canelo Bowers MD  Date of admission: 2023      Subjective   Brief summary.  Patient admitted with cellulitis and abscess of the arm and axillary area      HPI:  Still feeling same swelling and pain.  Ultrasound with fluid collection and possible abscess and possible foreign body    Review of Systems     No fever chills.  No shortness of breath      Objective     Vitals:   Temp:  [97.1 °F (36.2 °C)-98 °F (36.7 °C)] 97.4 °F (36.3 °C)  Heart Rate:  [50-69] 65  Resp:  [14-18] 16  BP: (103-127)/(60-78) 118/64  FiO2 (%):  [92 %-100 %] 92 %    Physical Exam :     Young male not in acute distress, heart regular, lungs clear.  Right upper extremity swollen around axillary area in the proximal arm.  Peripheral pulses intact, no signs of compartment syndrome.    Result Review:  I have personally reviewed the results from the time of this admission to 2023 15:44 EDT and agree with these findings:  [x]  Laboratory  []  Microbiology  [x]  Radiology  []  EKG/Telemetry   []  Cardiology/Vascular   []  Pathology  []  Old records  []  Other:           Assessment / Plan       Active Hospital Problems:  Active Hospital Problems    Diagnosis     **Abscess of axilla     Cellulitis of right upper extremity        Plan:   Continue antibiotics, for surgical drainage today.  Pain control with narcotics.  Increase activity as tolerates.       DVT prophylaxis:  Medical and mechanical DVT prophylaxis orders are present.    CODE STATUS:   Code Status (Patient has no pulse and is not breathing): CPR (Attempt to Resuscitate)  Medical Interventions (Patient has pulse or is breathing): Full Support            Electronically signed by Canelo Bowers MD, 23, 3:43 PM EDT.

## 2023-07-12 NOTE — PROGRESS NOTES
"[facility] Clinical Pharmacy Services: Vancomycin Monitoring Note    Mat Middleton is a 23 y.o. male who is on day  of pharmacy to dose vancomycin for Skin and Soft Tissue.    Previous Vancomycin Dose:   1500 mg IV every  12  hours  Imaging Reviewed?: N/A  Updated Cultures and Sensitivities:   23: BCx2 NG24h  23: WOUND CX, RIGHT AXILLA: scant gram positive cocci  23: WOUND CX, RIGHT AXILLA: MRSA    Vitals/Labs  Ht: 180.3 cm (70.98\"); Wt: 90.8 kg (200 lb 2.8 oz)     Temp (24hrs), Av.5 °F (36.4 °C), Min:97.1 °F (36.2 °C), Max:98 °F (36.7 °C)    Estimated Creatinine Clearance: 147.9 mL/min (by C-G formula based on SCr of 0.88 mg/dL).       Results from last 7 days   Lab Units 23  1425 23  0518 07/10/23  2251 23  1851   VANCOMYCIN TR mcg/mL 30.60*  --   --   --    CREATININE mg/dL  --  0.88 1.00 0.89   WBC 10*3/mm3  --   --  7.14 12.85*     Assessment/Plan    Current Vancomycin Dose:  1500 mg IV every 12 hours; which provides the following predicted parameters:  Random  1425 returned 30.6, supratherapeutic due to drawn 1.5 hr post infusion  OR today  Will continue current regimen  Insight gives the following parameters:  AUC24,ss: 571 mg/L.hr  PAUC*: 94 %  Ctrough,ss: 16.8 mg/L  Pconc*: 32 %  Tox.: 12 %  Next Vanc Trough if therapy extended or SCr rises  We will continue to monitor patient changes and renal function     Thank you for involving pharmacy in this patient's care. Please contact pharmacy with any questions or concerns.      Robbie Lewis, PharmD  23  16:23 EDT      "

## 2023-07-13 LAB
BACTERIA SPEC AEROBE CULT: ABNORMAL
GRAM STN SPEC: ABNORMAL

## 2023-07-13 PROCEDURE — 25010000002 VANCOMYCIN 5 G RECONSTITUTED SOLUTION: Performed by: SURGERY

## 2023-07-13 PROCEDURE — 25010000002 KETOROLAC TROMETHAMINE PER 15 MG: Performed by: INTERNAL MEDICINE

## 2023-07-13 PROCEDURE — 25010000002 HEPARIN (PORCINE) PER 1000 UNITS: Performed by: SURGERY

## 2023-07-13 PROCEDURE — 99024 POSTOP FOLLOW-UP VISIT: CPT | Performed by: SURGERY

## 2023-07-13 PROCEDURE — 25010000002 MORPHINE PER 10 MG: Performed by: INTERNAL MEDICINE

## 2023-07-13 RX ORDER — SODIUM CHLORIDE, SODIUM LACTATE, POTASSIUM CHLORIDE, CALCIUM CHLORIDE 600; 310; 30; 20 MG/100ML; MG/100ML; MG/100ML; MG/100ML
50 INJECTION, SOLUTION INTRAVENOUS CONTINUOUS
Status: DISCONTINUED | OUTPATIENT
Start: 2023-07-13 | End: 2023-07-14 | Stop reason: HOSPADM

## 2023-07-13 RX ORDER — KETOROLAC TROMETHAMINE 15 MG/ML
15 INJECTION, SOLUTION INTRAMUSCULAR; INTRAVENOUS EVERY 6 HOURS PRN
Status: DISCONTINUED | OUTPATIENT
Start: 2023-07-13 | End: 2023-07-14 | Stop reason: HOSPADM

## 2023-07-13 RX ADMIN — HEPARIN SODIUM 5000 UNITS: 5000 INJECTION INTRAVENOUS; SUBCUTANEOUS at 14:48

## 2023-07-13 RX ADMIN — VANCOMYCIN HYDROCHLORIDE 1500 MG: 5 INJECTION, POWDER, LYOPHILIZED, FOR SOLUTION INTRAVENOUS at 00:45

## 2023-07-13 RX ADMIN — MORPHINE SULFATE 2 MG: 2 INJECTION, SOLUTION INTRAMUSCULAR; INTRAVENOUS at 18:58

## 2023-07-13 RX ADMIN — SODIUM CHLORIDE, POTASSIUM CHLORIDE, SODIUM LACTATE AND CALCIUM CHLORIDE 50 ML/HR: 600; 310; 30; 20 INJECTION, SOLUTION INTRAVENOUS at 22:51

## 2023-07-13 RX ADMIN — SENNOSIDES AND DOCUSATE SODIUM 2 TABLET: 50; 8.6 TABLET ORAL at 20:56

## 2023-07-13 RX ADMIN — MORPHINE SULFATE 2 MG: 2 INJECTION, SOLUTION INTRAMUSCULAR; INTRAVENOUS at 22:52

## 2023-07-13 RX ADMIN — NICOTINE 1 PATCH: 14 PATCH, EXTENDED RELEASE TRANSDERMAL at 10:16

## 2023-07-13 RX ADMIN — Medication 10 ML: at 20:57

## 2023-07-13 RX ADMIN — MORPHINE SULFATE 2 MG: 2 INJECTION, SOLUTION INTRAMUSCULAR; INTRAVENOUS at 08:49

## 2023-07-13 RX ADMIN — KETOROLAC TROMETHAMINE 15 MG: 15 INJECTION, SOLUTION INTRAMUSCULAR; INTRAVENOUS at 20:56

## 2023-07-13 RX ADMIN — HYDROCODONE BITARTRATE AND ACETAMINOPHEN 1 TABLET: 7.5; 325 TABLET ORAL at 14:52

## 2023-07-13 RX ADMIN — MORPHINE SULFATE 2 MG: 2 INJECTION, SOLUTION INTRAMUSCULAR; INTRAVENOUS at 04:24

## 2023-07-13 RX ADMIN — HEPARIN SODIUM 5000 UNITS: 5000 INJECTION INTRAVENOUS; SUBCUTANEOUS at 21:00

## 2023-07-13 RX ADMIN — SENNOSIDES AND DOCUSATE SODIUM 2 TABLET: 50; 8.6 TABLET ORAL at 08:38

## 2023-07-13 RX ADMIN — HEPARIN SODIUM 5000 UNITS: 5000 INJECTION INTRAVENOUS; SUBCUTANEOUS at 06:26

## 2023-07-13 RX ADMIN — KETOROLAC TROMETHAMINE 15 MG: 15 INJECTION, SOLUTION INTRAMUSCULAR; INTRAVENOUS at 10:27

## 2023-07-13 RX ADMIN — MORPHINE SULFATE 2 MG: 2 INJECTION, SOLUTION INTRAMUSCULAR; INTRAVENOUS at 06:26

## 2023-07-13 RX ADMIN — VANCOMYCIN HYDROCHLORIDE 1500 MG: 5 INJECTION, POWDER, LYOPHILIZED, FOR SOLUTION INTRAVENOUS at 12:23

## 2023-07-13 NOTE — PLAN OF CARE
Goal Outcome Evaluation:         Patient pleasant through shift. VSS. Up ad vanessa. Patient ambulated to shower. Alert and oriented. Complaints of pain controlled with PRN medication. See MAR. Bed in lowest locked position. Call light and table within reach. No concerns per patient at this time.   Valente Herzog RN

## 2023-07-13 NOTE — PLAN OF CARE
Goal Outcome Evaluation:VITALS STABLE , RIGHT ARM DRESSING REAPPLIED ,PENROSE DRAIN IN PLACE MODERATE  SEROSANGUINEOUS DRAINAGE  NOTED . VOIDING WITHOUT DIFFICULTY , TOLERATING DIET .Linda Anne RN

## 2023-07-13 NOTE — PROGRESS NOTES
"[facility] Clinical Pharmacy Services: Vancomycin Monitoring Note    Mat Middleton is a 23 y.o. male who is on day 3 of pharmacy to dose vancomycin for right axilla Skin and Soft Tissue with abscess s/p I&D on .    Previous Vancomycin Dose:   1500 mg IV every  12  hours  Imaging Reviewed?: Yes  Updated Cultures and Sensitivities:   : Wnd Cx, R Axilla: MRSA  : OR Wnd Cx, R Axilla: scant gram positive cocci  : BCx2 NGTD    Vitals/Labs  Ht: 180.3 cm (70.98\"); Wt: 90.8 kg (200 lb 2.8 oz)   Temp (24hrs), Av.7 °F (36.5 °C), Min:97.1 °F (36.2 °C), Max:98.4 °F (36.9 °C)   Estimated Creatinine Clearance: 147.9 mL/min (by C-G formula based on SCr of 0.88 mg/dL).       Results from last 7 days   Lab Units 23  1425 23  0518 07/10/23  2251 23  1851   VANCOMYCIN TR mcg/mL 30.60*  --   --   --    CREATININE mg/dL  --  0.88 1.00 0.89   WBC 10*3/mm3  --   --  7.14 12.85*     Assessment/Plan    Current Vancomycin Dose:  1500 mg IV every 12 hours; which provides the following predicted parameters:  AUC24,ss: 571 mg/L.hr  PAUC*: 94 %  Ctrough,ss: 16.8 mg/L  Pconc*: 32 %  Tox.: 12 %  Next Vanc Trough if therapy extended or SCr rises (BMP ordered for tomorrow with AM labs)  We will continue to monitor patient changes and renal function     Thank you for involving pharmacy in this patient's care. Please contact pharmacy with any questions or concerns.    Frederick Navarrete RPH, RUDDY  Clinical Pharmacy Specialist - Infectious Diseases  "

## 2023-07-13 NOTE — PROGRESS NOTES
"POST OP PROGRESS NOTE     Patient Name:  Mat Middleton  YOB: 2000  [id number]   LOS: 2 days   1 Day Post-Op  Patient Care Team:  Canelo Bowers MD as PCP - General (Internal Medicine)          Subjective     Interval History: Vital signs stable, afebrile, pain controlled      Review of Systems:    A complete review of systems was performed and all are negative except what is documented in the HPI.       Objective     Constitutional:  well nourished, no acute distress, appear stated age /63 (BP Location: Left arm, Patient Position: Lying)   Pulse 64   Temp 97.7 °F (36.5 °C) (Oral)   Resp 16   Ht 180.3 cm (70.98\")   Wt 90.8 kg (200 lb 2.8 oz)   SpO2 99%   BMI 27.93 kg/m²    Eyes:  anicteric sclerae, moist conjunctivae, no lid lag, PERRLA  ENMT:  oropharynx clear, moist mucous membranes, good dentition  Neck:   full ROM, trachea midline, no thyromegaly  Cardiovascular: RRR, S1 and S2 present, no MRG, heart rate 64, no pedal edema  Respiratory: lungs CTA, respirations even and unlabored  GI:  Abdomen soft, nontender, nondistended, no HSM     Skin:  warm and dry, normal turgor, no rashes, right arm incision with dressing intact no drainage noted  Psychiatric:  alert and oriented x3, intact judgment and insight, cooperative          Results Review:       I reviewed the patient's new clinical results including CBC, BMP.     WBC   Date Value Ref Range Status   07/10/2023 7.14 3.40 - 10.80 10*3/mm3 Final     RBC   Date Value Ref Range Status   07/10/2023 4.65 4.14 - 5.80 10*6/mm3 Final     Hemoglobin   Date Value Ref Range Status   07/10/2023 13.7 13.0 - 17.7 g/dL Final     Hematocrit   Date Value Ref Range Status   07/10/2023 40.4 37.5 - 51.0 % Final     MCV   Date Value Ref Range Status   07/10/2023 86.9 79.0 - 97.0 fL Final     MCH   Date Value Ref Range Status   07/10/2023 29.5 26.6 - 33.0 pg Final     MCHC   Date Value Ref Range Status   07/10/2023 33.9 31.5 - 35.7 g/dL Final     RDW "   Date Value Ref Range Status   07/10/2023 12.6 12.3 - 15.4 % Final     RDW-SD   Date Value Ref Range Status   07/10/2023 39.8 37.0 - 54.0 fl Final     MPV   Date Value Ref Range Status   07/10/2023 9.8 6.0 - 12.0 fL Final     Platelets   Date Value Ref Range Status   07/10/2023 254 140 - 450 10*3/mm3 Final     Neutrophil %   Date Value Ref Range Status   07/10/2023 52.7 42.7 - 76.0 % Final     Lymphocyte %   Date Value Ref Range Status   07/10/2023 34.9 19.6 - 45.3 % Final     Monocyte %   Date Value Ref Range Status   07/10/2023 9.4 5.0 - 12.0 % Final     Eosinophil %   Date Value Ref Range Status   07/10/2023 2.4 0.3 - 6.2 % Final     Basophil %   Date Value Ref Range Status   07/10/2023 0.3 0.0 - 1.5 % Final     Immature Grans %   Date Value Ref Range Status   07/10/2023 0.3 0.0 - 0.5 % Final     Neutrophils, Absolute   Date Value Ref Range Status   07/10/2023 3.77 1.70 - 7.00 10*3/mm3 Final     Lymphocytes, Absolute   Date Value Ref Range Status   07/10/2023 2.49 0.70 - 3.10 10*3/mm3 Final     Monocytes, Absolute   Date Value Ref Range Status   07/10/2023 0.67 0.10 - 0.90 10*3/mm3 Final     Eosinophils, Absolute   Date Value Ref Range Status   07/10/2023 0.17 0.00 - 0.40 10*3/mm3 Final     Basophils, Absolute   Date Value Ref Range Status   07/10/2023 0.02 0.00 - 0.20 10*3/mm3 Final     Immature Grans, Absolute   Date Value Ref Range Status   07/10/2023 0.02 0.00 - 0.05 10*3/mm3 Final     nRBC   Date Value Ref Range Status   07/10/2023 0.0 0.0 - 0.2 /100 WBC Final         Basic Metabolic Panel    Sodium Sodium   Date Value Ref Range Status   07/11/2023 138 136 - 145 mmol/L Final   07/10/2023 139 136 - 145 mmol/L Final      Potassium Potassium   Date Value Ref Range Status   07/11/2023 3.8 3.5 - 5.2 mmol/L Final   07/10/2023 3.1 (L) 3.5 - 5.2 mmol/L Final      Chloride Chloride   Date Value Ref Range Status   07/11/2023 108 (H) 98 - 107 mmol/L Final   07/10/2023 101 98 - 107 mmol/L Final      Bicarbonate No  results found for: PLASMABICARB   BUN BUN   Date Value Ref Range Status   07/11/2023 16 6 - 20 mg/dL Final   07/10/2023 18 6 - 20 mg/dL Final      Creatinine Creatinine   Date Value Ref Range Status   07/11/2023 0.88 0.76 - 1.27 mg/dL Final   07/10/2023 1.00 0.76 - 1.27 mg/dL Final      Calcium Calcium   Date Value Ref Range Status   07/11/2023 8.3 (L) 8.6 - 10.5 mg/dL Final   07/10/2023 9.3 8.6 - 10.5 mg/dL Final      Glucose      No components found for: GLUCOSE.*       Lab Results   Component Value Date    GLUCOSE 96 07/11/2023    BUN 16 07/11/2023    CREATININE 0.88 07/11/2023    EGFR 122.4 07/11/2023    BCR 18.2 07/11/2023    K 3.8 07/11/2023    CO2 19.0 (L) 07/11/2023    CALCIUM 8.3 (L) 07/11/2023    ALBUMIN 3.7 07/11/2023    BILITOT 0.2 07/10/2023    AST 21 07/10/2023    ALT 21 07/10/2023       IMAGING:  Imaging Results (Last 72 Hours)       Procedure Component Value Units Date/Time    US Axilla Right [[id number]] Collected: 07/11/23 1657     Updated: 07/11/23 1701    Narrative:      PROCEDURE: US AXILLA RIGHT     COMPARISON: None     INDICATIONS: Evaluate for fluid collection to help with operative planning, previous I&D,   induration on exam     TECHNIQUE: A targeted breast ultrasound was performed, with evaluation focusing only on specific   areas of concern.     FINDINGS: Images are labeled right axilla, region of interest.  There is extensive subcutaneous   edema and ill-defined 1.5 x 0.4 x 0.6 cm fluid collection (which is not well loculated ) extending   to the skin surface where there reportedly is an open a wound.  There are multiple tiny echogenic   foci superficially which are likely foci of air given history and presence of the open wound in   this area immediately adjacent to the fluid is a 7 mm long curvilinear echogenic shadowing focus is   also present adjacent to some ill-defined fluid.  This could represent a radiopaque foreign body in   the appropriate clinical scenario.          Impression:       1. There is extensive subcutaneous edema and a 1.5 x 0.4 x 0.6 cm non loculated fluid   collection extending to the skin surface where there is an open abdominal wound.  A loculated fluid   collection to indicate a well-formed abscess is not demonstrated.  2. Adjacent to the fluid is a 7 mm long curvilinear shadowing echogenic focus , potentially a   retained foreign body.  Correlation with clinical scenario is needed.  Correlation with clinical   scenario is needed.            OBDULIO FLORES DO         Electronically Signed and Approved By: OBDULIO FLORES DO on 7/11/2023 at 16:57                             Medications:    Current Facility-Administered Medications:     sennosides-docusate (PERICOLACE) 8.6-50 MG per tablet 2 tablet, 2 tablet, Oral, BID, 2 tablet at 07/13/23 0838 **AND** polyethylene glycol (MIRALAX) packet 17 g, 17 g, Oral, Daily PRN **AND** bisacodyl (DULCOLAX) EC tablet 5 mg, 5 mg, Oral, Daily PRN **AND** bisacodyl (DULCOLAX) suppository 10 mg, 10 mg, Rectal, Daily PRN, Edgardo Forbes MD    heparin (porcine) 5000 UNIT/ML injection 5,000 Units, 5,000 Units, Subcutaneous, Q8H, Edgardo Forbes MD, 5,000 Units at 07/13/23 0626    HYDROcodone-acetaminophen (NORCO) 7.5-325 MG per tablet 1 tablet, 1 tablet, Oral, Q4H PRN, Edgardo Forbes MD, 1 tablet at 07/12/23 1642    ketorolac (TORADOL) injection 15 mg, 15 mg, Intravenous, Q6H PRN, Canelo Bowers MD, 15 mg at 07/13/23 1027    lactated ringers infusion, 50 mL/hr, Intravenous, Continuous, Argelia Llamas APRN, Last Rate: 50 mL/hr at 07/13/23 0849, 50 mL/hr at 07/13/23 0849    morphine injection 2 mg, 2 mg, Intravenous, Q2H PRN, Canelo Bowers MD, 2 mg at 07/13/23 0849    nicotine (NICODERM CQ) 14 MG/24HR patch 1 patch, 1 patch, Transdermal, Q24H, Edgardo Forbes MD, 1 patch at 07/13/23 1016    nitroglycerin (NITROSTAT) SL tablet 0.4 mg, 0.4 mg, Sublingual, Q5 Min PRN, Edgardo Forbes MD    ondansetron (ZOFRAN)  injection 4 mg, 4 mg, Intravenous, Q6H Leidy JOYNER Matthew Bruce, MD    [MAR Hold] Pharmacy to dose vancomycin, , Does not apply, Continuous PRN, Argelia Llamas APRN    [COMPLETED] Insert Peripheral IV, , , Once **AND** sodium chloride 0.9 % flush 10 mL, 10 mL, Intravenous, Leidy JOYNER Matthew Bruce, MD    sodium chloride 0.9 % flush 10 mL, 10 mL, Intravenous, Q12H, Edgardo Forbes MD, 10 mL at 07/12/23 2330    sodium chloride 0.9 % flush 10 mL, 10 mL, Intravenous, Leidy JOYNER Matthew Bruce, MD    sodium chloride 0.9 % infusion 40 mL, 40 mL, Intravenous, Leidy JOYNER Matthew Bruce, MD    vancomycin IVPB 1500 mg in 0.9% NaCl (Premix) 500 mL, 1,500 mg, Intravenous, Q12H, Edgardo Forbes MD, Last Rate: 333.3 mL/hr at 07/13/23 0045, 1,500 mg at 07/13/23 0045    Assessment & Plan       Abscess of axilla    Cellulitis of right upper extremity  S/P incision and drainage right axilla abscess   Continue IV antibiotics   Cbc in a.m.   Continue localized wound care          Electronically signed by KAN Gasca, 07/13/23, 10:38 AM EDT.

## 2023-07-13 NOTE — PROGRESS NOTES
[facility]     Progress Note    Patient Name: Mat Middleton  : 2000  MRN: [id number]  Primary Care Physician:  Canelo Bowers MD  Date of admission: 2023      Subjective   Brief summary.  Patient admitted with cellulitis and abscess of the arm and axillary area      HPI:  Patient is status post surgical drainage of the abscess on the right arm and axillary area.  Still in a lot of pain, minimal drainage from the drain.    Review of Systems     denies any fever chills, no shortness of breath no chest pain      Objective     Vitals:   Temp:  [97.6 °F (36.4 °C)-98.4 °F (36.9 °C)] 97.7 °F (36.5 °C)  Heart Rate:  [57-74] 63  Resp:  [16-18] 16  BP: (120-135)/(59-73) 122/70    Physical Exam :     Young male not in acute distress, heart regular, lungs clear.  Right upper extremity swollen around axillary area in the proximal arm.  Surgical dressing with a drain present.  Peripheral pulses intact, no signs of compartment syndrome.    Result Review:  I have personally reviewed the results from the time of this admission to 2023 16:17 EDT and agree with these findings:  [x]  Laboratory  []  Microbiology  [x]  Radiology  []  EKG/Telemetry   []  Cardiology/Vascular   []  Pathology  []  Old records  []  Other:           Assessment / Plan       Active Hospital Problems:  Active Hospital Problems    Diagnosis     **Abscess of axilla     Cellulitis of right upper extremity        Plan:   Continue antibiotics, wound care and surgical drain.  Pain meds continued add Toradol as needed, check labs in a.m.       DVT prophylaxis:  Medical and mechanical DVT prophylaxis orders are present.    CODE STATUS:   Code Status (Patient has no pulse and is not breathing): CPR (Attempt to Resuscitate)  Medical Interventions (Patient has pulse or is breathing): Full Support              Electronically signed by Canelo Bowers MD, 23, 4:18 PM EDT.

## 2023-07-14 VITALS
SYSTOLIC BLOOD PRESSURE: 130 MMHG | OXYGEN SATURATION: 99 % | DIASTOLIC BLOOD PRESSURE: 72 MMHG | HEART RATE: 57 BPM | BODY MASS INDEX: 28.02 KG/M2 | WEIGHT: 200.18 LBS | TEMPERATURE: 98.3 F | HEIGHT: 71 IN | RESPIRATION RATE: 18 BRPM

## 2023-07-14 PROBLEM — L02.419 ABSCESS OF AXILLA: Status: RESOLVED | Noted: 2023-07-11 | Resolved: 2023-07-14

## 2023-07-14 LAB
ANION GAP SERPL CALCULATED.3IONS-SCNC: 9.3 MMOL/L (ref 5–15)
BASOPHILS # BLD AUTO: 0.05 10*3/MM3 (ref 0–0.2)
BASOPHILS NFR BLD AUTO: 0.7 % (ref 0–1.5)
BUN SERPL-MCNC: 16 MG/DL (ref 6–20)
BUN/CREAT SERPL: 21.3 (ref 7–25)
CALCIUM SPEC-SCNC: 8.5 MG/DL (ref 8.6–10.5)
CHLORIDE SERPL-SCNC: 106 MMOL/L (ref 98–107)
CO2 SERPL-SCNC: 19.7 MMOL/L (ref 22–29)
CREAT SERPL-MCNC: 0.75 MG/DL (ref 0.76–1.27)
DEPRECATED RDW RBC AUTO: 41.5 FL (ref 37–54)
EGFRCR SERPLBLD CKD-EPI 2021: 128.4 ML/MIN/1.73
EOSINOPHIL # BLD AUTO: 0.24 10*3/MM3 (ref 0–0.4)
EOSINOPHIL NFR BLD AUTO: 3.5 % (ref 0.3–6.2)
ERYTHROCYTE [DISTWIDTH] IN BLOOD BY AUTOMATED COUNT: 12.3 % (ref 12.3–15.4)
GLUCOSE SERPL-MCNC: 98 MG/DL (ref 65–99)
HCT VFR BLD AUTO: 44.9 % (ref 37.5–51)
HGB BLD-MCNC: 14.4 G/DL (ref 13–17.7)
IMM GRANULOCYTES # BLD AUTO: 0.03 10*3/MM3 (ref 0–0.05)
IMM GRANULOCYTES NFR BLD AUTO: 0.4 % (ref 0–0.5)
LYMPHOCYTES # BLD AUTO: 3.32 10*3/MM3 (ref 0.7–3.1)
LYMPHOCYTES NFR BLD AUTO: 48.4 % (ref 19.6–45.3)
MCH RBC QN AUTO: 29.6 PG (ref 26.6–33)
MCHC RBC AUTO-ENTMCNC: 32.1 G/DL (ref 31.5–35.7)
MCV RBC AUTO: 92.2 FL (ref 79–97)
MONOCYTES # BLD AUTO: 0.54 10*3/MM3 (ref 0.1–0.9)
MONOCYTES NFR BLD AUTO: 7.9 % (ref 5–12)
NEUTROPHILS NFR BLD AUTO: 2.68 10*3/MM3 (ref 1.7–7)
NEUTROPHILS NFR BLD AUTO: 39.1 % (ref 42.7–76)
NRBC BLD AUTO-RTO: 0 /100 WBC (ref 0–0.2)
PLATELET # BLD AUTO: 245 10*3/MM3 (ref 140–450)
PMV BLD AUTO: 10.6 FL (ref 6–12)
POTASSIUM SERPL-SCNC: 4.5 MMOL/L (ref 3.5–5.2)
RBC # BLD AUTO: 4.87 10*6/MM3 (ref 4.14–5.8)
SODIUM SERPL-SCNC: 135 MMOL/L (ref 136–145)
WBC NRBC COR # BLD: 6.86 10*3/MM3 (ref 3.4–10.8)

## 2023-07-14 PROCEDURE — 85025 COMPLETE CBC W/AUTO DIFF WBC: CPT | Performed by: NURSE PRACTITIONER

## 2023-07-14 PROCEDURE — 25010000002 HEPARIN (PORCINE) PER 1000 UNITS: Performed by: SURGERY

## 2023-07-14 PROCEDURE — 25010000002 VANCOMYCIN 5 G RECONSTITUTED SOLUTION: Performed by: SURGERY

## 2023-07-14 PROCEDURE — 99024 POSTOP FOLLOW-UP VISIT: CPT | Performed by: NURSE PRACTITIONER

## 2023-07-14 PROCEDURE — 80048 BASIC METABOLIC PNL TOTAL CA: CPT | Performed by: INTERNAL MEDICINE

## 2023-07-14 RX ORDER — HYDROCODONE BITARTRATE AND ACETAMINOPHEN 7.5; 325 MG/1; MG/1
.5-1 TABLET ORAL EVERY 6 HOURS PRN
Qty: 20 TABLET | Refills: 0 | Status: SHIPPED | OUTPATIENT
Start: 2023-07-14 | End: 2023-07-19

## 2023-07-14 RX ORDER — NICOTINE 21 MG/24HR
1 PATCH, TRANSDERMAL 24 HOURS TRANSDERMAL
Qty: 30 PATCH | Refills: 0 | Status: SHIPPED | OUTPATIENT
Start: 2023-07-15 | End: 2023-08-14

## 2023-07-14 RX ORDER — SULFAMETHOXAZOLE AND TRIMETHOPRIM 800; 160 MG/1; MG/1
1 TABLET ORAL 2 TIMES DAILY
Qty: 20 TABLET | Refills: 0 | Status: SHIPPED | OUTPATIENT
Start: 2023-07-14 | End: 2023-07-24

## 2023-07-14 RX ADMIN — SENNOSIDES AND DOCUSATE SODIUM 2 TABLET: 50; 8.6 TABLET ORAL at 08:41

## 2023-07-14 RX ADMIN — HEPARIN SODIUM 5000 UNITS: 5000 INJECTION INTRAVENOUS; SUBCUTANEOUS at 08:40

## 2023-07-14 RX ADMIN — HYDROCODONE BITARTRATE AND ACETAMINOPHEN 1 TABLET: 7.5; 325 TABLET ORAL at 02:11

## 2023-07-14 RX ADMIN — VANCOMYCIN HYDROCHLORIDE 1500 MG: 5 INJECTION, POWDER, LYOPHILIZED, FOR SOLUTION INTRAVENOUS at 00:30

## 2023-07-14 RX ADMIN — NICOTINE 1 PATCH: 14 PATCH, EXTENDED RELEASE TRANSDERMAL at 10:45

## 2023-07-14 NOTE — PROGRESS NOTES
"POST OP PROGRESS NOTE     Patient Name:  Mat Middleton  YOB: 2000  [id number]   LOS: 3 days   2 Days Post-Op  Patient Care Team:  Canelo Bowers MD as PCP - General (Internal Medicine)          Subjective     Interval History:  VSS, afebrile, pain controlled      Review of Systems:    A complete review of systems was performed and all are negative except what is documented in the HPI.       Objective     Constitutional:  well nourished, no acute distress, appear stated age /79 (BP Location: Left arm, Patient Position: Lying)   Pulse 68   Temp 97.4 °F (36.3 °C) (Oral)   Resp 16   Ht 180.3 cm (70.98\")   Wt 90.8 kg (200 lb 2.8 oz)   SpO2 99%   BMI 27.93 kg/m²    Eyes:  anicteric sclerae, moist conjunctivae, no lid lag, PERRLA  ENMT:  oropharynx clear, moist mucous membranes, good dentition  Neck:   full ROM, trachea midline, no thyromegaly  Cardiovascular: RRR, S1 and S2 present, no MRG, heart rate 68, no pedal edema  Respiratory: lungs CTA, respirations even and unlabored  GI:  Abdomen soft, nontender, nondistended, no HSM     Skin:  warm and dry, normal turgor, no rashes, right axilla with dressing intact,   Psychiatric:  alert and oriented x3, intact judgment and insight, cooperative          Results Review:       I reviewed the patient's new clinical results including  CBC and BMP.     WBC   Date Value Ref Range Status   07/14/2023 6.86 3.40 - 10.80 10*3/mm3 Final     RBC   Date Value Ref Range Status   07/14/2023 4.87 4.14 - 5.80 10*6/mm3 Final     Hemoglobin   Date Value Ref Range Status   07/14/2023 14.4 13.0 - 17.7 g/dL Final     Hematocrit   Date Value Ref Range Status   07/14/2023 44.9 37.5 - 51.0 % Final     MCV   Date Value Ref Range Status   07/14/2023 92.2 79.0 - 97.0 fL Final     MCH   Date Value Ref Range Status   07/14/2023 29.6 26.6 - 33.0 pg Final     MCHC   Date Value Ref Range Status   07/14/2023 32.1 31.5 - 35.7 g/dL Final     RDW   Date Value Ref Range Status "   07/14/2023 12.3 12.3 - 15.4 % Final     RDW-SD   Date Value Ref Range Status   07/14/2023 41.5 37.0 - 54.0 fl Final     MPV   Date Value Ref Range Status   07/14/2023 10.6 6.0 - 12.0 fL Final     Platelets   Date Value Ref Range Status   07/14/2023 245 140 - 450 10*3/mm3 Final     Neutrophil %   Date Value Ref Range Status   07/14/2023 39.1 (L) 42.7 - 76.0 % Final     Lymphocyte %   Date Value Ref Range Status   07/14/2023 48.4 (H) 19.6 - 45.3 % Final     Monocyte %   Date Value Ref Range Status   07/14/2023 7.9 5.0 - 12.0 % Final     Eosinophil %   Date Value Ref Range Status   07/14/2023 3.5 0.3 - 6.2 % Final     Basophil %   Date Value Ref Range Status   07/14/2023 0.7 0.0 - 1.5 % Final     Immature Grans %   Date Value Ref Range Status   07/14/2023 0.4 0.0 - 0.5 % Final     Neutrophils, Absolute   Date Value Ref Range Status   07/14/2023 2.68 1.70 - 7.00 10*3/mm3 Final     Lymphocytes, Absolute   Date Value Ref Range Status   07/14/2023 3.32 (H) 0.70 - 3.10 10*3/mm3 Final     Monocytes, Absolute   Date Value Ref Range Status   07/14/2023 0.54 0.10 - 0.90 10*3/mm3 Final     Eosinophils, Absolute   Date Value Ref Range Status   07/14/2023 0.24 0.00 - 0.40 10*3/mm3 Final     Basophils, Absolute   Date Value Ref Range Status   07/14/2023 0.05 0.00 - 0.20 10*3/mm3 Final     Immature Grans, Absolute   Date Value Ref Range Status   07/14/2023 0.03 0.00 - 0.05 10*3/mm3 Final     nRBC   Date Value Ref Range Status   07/14/2023 0.0 0.0 - 0.2 /100 WBC Final         Basic Metabolic Panel    Sodium Sodium   Date Value Ref Range Status   07/14/2023 135 (L) 136 - 145 mmol/L Final      Potassium Potassium   Date Value Ref Range Status   07/14/2023 4.5 3.5 - 5.2 mmol/L Final     Comment:     Slight hemolysis detected by analyzer. Results may be affected.      Chloride Chloride   Date Value Ref Range Status   07/14/2023 106 98 - 107 mmol/L Final      Bicarbonate No results found for: PLASMABICARB   BUN BUN   Date Value Ref  Range Status   07/14/2023 16 6 - 20 mg/dL Final      Creatinine Creatinine   Date Value Ref Range Status   07/14/2023 0.75 (L) 0.76 - 1.27 mg/dL Final      Calcium Calcium   Date Value Ref Range Status   07/14/2023 8.5 (L) 8.6 - 10.5 mg/dL Final      Glucose      No components found for: GLUCOSE.*       Lab Results   Component Value Date    GLUCOSE 98 07/14/2023    BUN 16 07/14/2023    CREATININE 0.75 (L) 07/14/2023    EGFR 128.4 07/14/2023    BCR 21.3 07/14/2023    K 4.5 07/14/2023    CO2 19.7 (L) 07/14/2023    CALCIUM 8.5 (L) 07/14/2023    ALBUMIN 3.7 07/11/2023    BILITOT 0.2 07/10/2023    AST 21 07/10/2023    ALT 21 07/10/2023       IMAGING:  Imaging Results (Last 72 Hours)       Procedure Component Value Units Date/Time    US Axilla Right [[id number]] Collected: 07/11/23 1657     Updated: 07/11/23 1701    Narrative:      PROCEDURE: US AXILLA RIGHT     COMPARISON: None     INDICATIONS: Evaluate for fluid collection to help with operative planning, previous I&D,   induration on exam     TECHNIQUE: A targeted breast ultrasound was performed, with evaluation focusing only on specific   areas of concern.     FINDINGS: Images are labeled right axilla, region of interest.  There is extensive subcutaneous   edema and ill-defined 1.5 x 0.4 x 0.6 cm fluid collection (which is not well loculated ) extending   to the skin surface where there reportedly is an open a wound.  There are multiple tiny echogenic   foci superficially which are likely foci of air given history and presence of the open wound in   this area immediately adjacent to the fluid is a 7 mm long curvilinear echogenic shadowing focus is   also present adjacent to some ill-defined fluid.  This could represent a radiopaque foreign body in   the appropriate clinical scenario.          Impression:      1. There is extensive subcutaneous edema and a 1.5 x 0.4 x 0.6 cm non loculated fluid   collection extending to the skin surface where there is an open abdominal  wound.  A loculated fluid   collection to indicate a well-formed abscess is not demonstrated.  2. Adjacent to the fluid is a 7 mm long curvilinear shadowing echogenic focus , potentially a   retained foreign body.  Correlation with clinical scenario is needed.  Correlation with clinical   scenario is needed.            OBDULIO FLORES DO         Electronically Signed and Approved By: OBDULIO FLORES DO on 7/11/2023 at 16:57                             Medications:    Current Facility-Administered Medications:     sennosides-docusate (PERICOLACE) 8.6-50 MG per tablet 2 tablet, 2 tablet, Oral, BID, 2 tablet at 07/14/23 0841 **AND** polyethylene glycol (MIRALAX) packet 17 g, 17 g, Oral, Daily PRN **AND** bisacodyl (DULCOLAX) EC tablet 5 mg, 5 mg, Oral, Daily PRN **AND** bisacodyl (DULCOLAX) suppository 10 mg, 10 mg, Rectal, Daily PRN, Edgardo Forbes MD    heparin (porcine) 5000 UNIT/ML injection 5,000 Units, 5,000 Units, Subcutaneous, Q8H, Edgardo Forbes MD, 5,000 Units at 07/14/23 0840    HYDROcodone-acetaminophen (NORCO) 7.5-325 MG per tablet 1 tablet, 1 tablet, Oral, Q4H PRN, Edgardo Forbes MD, 1 tablet at 07/14/23 0211    ketorolac (TORADOL) injection 15 mg, 15 mg, Intravenous, Q6H PRN, Canelo Bowers MD, 15 mg at 07/13/23 2056    lactated ringers infusion, 50 mL/hr, Intravenous, Continuous, Argelia Llamas APRN, Last Rate: 50 mL/hr at 07/13/23 2251, 50 mL/hr at 07/13/23 2251    morphine injection 2 mg, 2 mg, Intravenous, Q2H PRN, Canelo Bowers MD, 2 mg at 07/13/23 2252    nicotine (NICODERM CQ) 14 MG/24HR patch 1 patch, 1 patch, Transdermal, Q24H, Edgardo Forbes MD, 1 patch at 07/13/23 1016    nitroglycerin (NITROSTAT) SL tablet 0.4 mg, 0.4 mg, Sublingual, Q5 Min PRN, Edgardo Forbes MD    ondansetron (ZOFRAN) injection 4 mg, 4 mg, Intravenous, Q6H PRN, Edgardo Forbes MD    [MAR Hold] Pharmacy to dose vancomycin, , Does not apply, Continuous PRN, Argelia Llamas.,  APRN    [COMPLETED] Insert Peripheral IV, , , Once **AND** sodium chloride 0.9 % flush 10 mL, 10 mL, Intravenous, PRN, Edgardo Forbes MD    sodium chloride 0.9 % flush 10 mL, 10 mL, Intravenous, Q12H, Edgardo Forbes MD, 10 mL at 07/13/23 2057    sodium chloride 0.9 % flush 10 mL, 10 mL, Intravenous, PRN, Edgardo Forbes MD    sodium chloride 0.9 % infusion 40 mL, 40 mL, Intravenous, PRN, Edgardo Forbes MD    vancomycin IVPB 1500 mg in 0.9% NaCl (Premix) 500 mL, 1,500 mg, Intravenous, Q12H, Edgardo Forbes MD, Last Rate: 333.3 mL/hr at 07/14/23 0030, 1,500 mg at 07/14/23 0030    Assessment & Plan       Abscess of axilla    Cellulitis of right upper extremity  S/P I and D of right axilla abscess   -cont current care   -ok to MA  home at any time          Electronically signed by AKN Gasca, 07/14/23, 10:03 AM EDT.

## 2023-07-14 NOTE — PLAN OF CARE
Goal Outcome Evaluation:         Education complete. Patient is discharging.   Valente Herzog RN

## 2023-07-14 NOTE — DISCHARGE SUMMARY
[facility]         DISCHARGE SUMMARY    Patient Name: Mat Middleton  : 2000  MRN: [id number]    Date of Admission: 2023  Date of Discharge:   Primary Care Physician: Canelo Bowers MD    Consults       Date and Time Order Name Status Description    2023  3:28 PM Inpatient General Surgery Consult Completed     2023  1:18 AM Hospitalist (on-call MD unless specified)              Presenting Problem:   Abscess of axilla [L02.419]  Cellulitis of right arm [L03.113]  Cutaneous abscess of right axilla [L02.411]    Active and Resolved Hospital Problems:  Active Hospital Problems    Diagnosis POA    Cellulitis of right upper extremity [L03.113] Yes      Resolved Hospital Problems    Diagnosis POA    Abscess of axilla [L02.419] Yes         Hospital Course     Hospital Course:  Mat Middleton is a 23 y.o. male admitted to hospital for abscess of the right upper extremity upper arm.  Patient has infection and swelling going on in the arm for few days.  This has been going on for at least 2 weeks and was seen in emergency room and was given local antibiotics.  Patient did not feel any better, admitted to hospital the hospitalist and subsequently transferred to my service as patient belongs to our group.  Patient has advanced cellulitis with abscess.  I consulted surgeon to drain.  Ultrasound showed questionable foreign body and abscess.  Dr. Forbes saw patient and performed surgical incision and drainage.  Patient felt better after the procedure.  His cultures grew MRSA.  He was given IV vancomycin.  His white cell count is normal.  He has no fever.    As he is doing better he will be discharged to home surgeon, Dr. Forbes has cleared him for discharge with the drain.  Patient advised to take care of drain and keep the wound clean          DISCHARGE Follow Up Recommendations for labs and diagnostics:   Discharge to home  Follow-up with me next week  Follow-up with   Leidy as recommended      Day of Discharge     Vital Signs:  Temp:  [97.4 °F (36.3 °C)-98 °F (36.7 °C)] 97.4 °F (36.3 °C)  Heart Rate:  [59-70] 68  Resp:  [16] 16  BP: (120-135)/(67-81) 122/81    Physical Exam:    Young male not in acute distress  Heart regular lungs clear  Right upper extremity with some edema, significantly improved, cellulitic changes still improved.  Drain in place.  Peripheral pulses intact.  No compartment syndrome signs      Pertinent  and/or Most Recent Results     LAB RESULTS:      Lab 07/14/23  0451 07/11/23 0313 07/10/23  2251   WBC 6.86  --  7.14   HEMOGLOBIN 14.4  --  13.7   HEMATOCRIT 44.9  --  40.4   PLATELETS 245  --  254   NEUTROS ABS 2.68  --  3.77   IMMATURE GRANS (ABS) 0.03  --  0.02   LYMPHS ABS 3.32*  --  2.49   MONOS ABS 0.54  --  0.67   EOS ABS 0.24  --  0.17   MCV 92.2  --  86.9   SED RATE  --   --  9   CRP  --   --  1.79*   LACTATE  --  0.6 2.7*         Lab 07/14/23  0451 07/11/23  0518 07/10/23  2251   SODIUM 135* 138 139   POTASSIUM 4.5 3.8 3.1*   CHLORIDE 106 108* 101   CO2 19.7* 19.0* 23.8   ANION GAP 9.3 11.0 14.2   BUN 16 16 18   CREATININE 0.75* 0.88 1.00   EGFR 128.4 122.4 107.1   GLUCOSE 98 96 71   CALCIUM 8.5* 8.3* 9.3   PHOSPHORUS  --  3.1  --          Lab 07/11/23  0518 07/10/23  2251   TOTAL PROTEIN  --  7.5   ALBUMIN 3.7 4.6   GLOBULIN  --  2.9   ALT (SGPT)  --  21   AST (SGOT)  --  21   BILIRUBIN  --  0.2   ALK PHOS  --  76                     Brief Urine Lab Results       None          Microbiology Results (last 10 days)       Procedure Component Value - Date/Time    Wound Culture - Wound, Axilla, Right [[id number]]  (Abnormal) Collected: 07/11/23 0044    Lab Status: Final result Specimen: Wound from Axilla, Right Updated: 07/13/23 0650     Wound Culture Scant growth (1+) Staphylococcus aureus, MRSA     Comment:   Methicillin resistant Staphylococcus aureus, Patient may be an isolation risk.        Gram Stain No organisms seen    Narrative:      Refer to  previous wound culture collected on 7/8/2023 1518 for MICs      Blood Culture - Blood, Arm, Left [[id number]]  (Normal) Collected: 07/11/23 0029    Lab Status: Preliminary result Specimen: Blood from Arm, Left Updated: 07/14/23 0045     Blood Culture No growth at 3 days    Blood Culture - Blood, Arm, Left [[id number]]  (Normal) Collected: 07/10/23 2251    Lab Status: Preliminary result Specimen: Blood from Arm, Left Updated: 07/13/23 2300     Blood Culture No growth at 3 days    Wound Culture - Wound, Axilla, Right [[id number]]  (Abnormal)  (Susceptibility) Collected: 07/08/23 1518    Lab Status: Final result Specimen: Wound from Axilla, Right Updated: 07/11/23 0817     Wound Culture Moderate growth (3+) Staphylococcus aureus, MRSA     Comment:   Methicillin resistant Staphylococcus aureus, Patient may be an isolation risk.        Gram Stain Few (2+) Gram positive cocci in pairs and clusters    Susceptibility        Staphylococcus aureus, MRSA      OLESYA      Clindamycin Susceptible      Erythromycin Resistant      Inducible Clindamycin Resistance Negative      Oxacillin Resistant      Rifampin Susceptible      Tetracycline Susceptible      Trimethoprim + Sulfamethoxazole Susceptible      Vancomycin Susceptible                       Susceptibility Comments       Staphylococcus aureus, MRSA    This isolate does not demonstrate inducible clindamycin resistance in vitro.                         PROCEDURES:    [unfilled]     Axilla Right    Result Date: 7/11/2023  Impression: 1. There is extensive subcutaneous edema and a 1.5 x 0.4 x 0.6 cm non loculated fluid collection extending to the skin surface where there is an open abdominal wound.  A loculated fluid collection to indicate a well-formed abscess is not demonstrated. 2. Adjacent to the fluid is a 7 mm long curvilinear shadowing echogenic focus , potentially a retained foreign body.  Correlation with clinical scenario is needed.  Correlation with clinical scenario is  needed.     OBDULIO FLORES DO       Electronically Signed and Approved By: OBDULIO FLORES DO on 7/11/2023 at 16:57                          Labs Pending at Discharge:  Pending Labs       Order Current Status    Blood Culture - Blood, Arm, Left Preliminary result    Blood Culture - Blood, Arm, Left Preliminary result              Discharge Details        Discharge Medications        New Medications        Instructions Start Date   HYDROcodone-acetaminophen 7.5-325 MG per tablet  Commonly known as: NORCO   0.5-1 tablets, Oral, Every 6 Hours PRN      nicotine 14 MG/24HR patch  Commonly known as: NICODERM CQ   1 patch, Transdermal, Every 24 Hours Scheduled   Start Date: July 15, 2023     sulfamethoxazole-trimethoprim 800-160 MG per tablet  Commonly known as: Bactrim DS   1 tablet, Oral, 2 Times Daily             Continue These Medications        Instructions Start Date   ibuprofen 800 MG tablet  Commonly known as: ADVIL,MOTRIN   800 mg, Oral, Every 6 Hours PRN      mupirocin 2 % ointment  Commonly known as: BACTROBAN   1 application , Topical, 3 Times Daily               No Known Allergies      Discharge Disposition:    Home or Self Care    Diet:    Regular    Discharge Activity:     Activity Instructions       Activity as Tolerated                No future appointments.    Additional Instructions for the Follow-ups that You Need to Schedule       Discharge Follow-up with PCP   As directed       Currently Documented PCP:    Canelo Bowers MD    PCP Phone Number:    [phone]     Follow Up Details: Next week         Discharge Follow-up with Specified Provider: Dr Forbes as recomended   As directed      To: Dr Forbes as recomended                 Time spent on Discharge including face to face service: 31 minutes.            I have dictated this note utilizing Dragon Dictation.             Please note that portions of this note were completed with a voice recognition program.             Part of this note may be an  electronic transcription/translation of spoken language to printed text         using the Dragon Dictation System.       Electronically signed by Canelo Bowers MD, 07/14/23, 11:08 AM EDT.

## 2023-07-15 LAB — BACTERIA SPEC AEROBE CULT: NORMAL

## 2023-07-16 LAB — BACTERIA SPEC AEROBE CULT: NORMAL

## 2023-07-20 ENCOUNTER — OFFICE VISIT (OUTPATIENT)
Dept: SURGERY | Facility: CLINIC | Age: 23
End: 2023-07-20
Payer: COMMERCIAL

## 2023-07-20 VITALS — HEIGHT: 70 IN | RESPIRATION RATE: 16 BRPM | BODY MASS INDEX: 29.35 KG/M2 | WEIGHT: 205 LBS

## 2023-07-20 DIAGNOSIS — L02.413 CUTANEOUS ABSCESS OF RIGHT UPPER EXTREMITY: Primary | ICD-10-CM

## 2023-07-20 PROCEDURE — 1160F RVW MEDS BY RX/DR IN RCRD: CPT | Performed by: SURGERY

## 2023-07-20 PROCEDURE — 99024 POSTOP FOLLOW-UP VISIT: CPT | Performed by: SURGERY

## 2023-07-20 PROCEDURE — 1159F MED LIST DOCD IN RCRD: CPT | Performed by: SURGERY

## 2023-07-27 ENCOUNTER — READMISSION MANAGEMENT (OUTPATIENT)
Dept: CALL CENTER | Facility: HOSPITAL | Age: 23
End: 2023-07-27
Payer: COMMERCIAL

## 2023-07-27 NOTE — OUTREACH NOTE
General Surgery Week 2 Survey      Flowsheet Row Responses   [facility] facility patient discharged from? [facility]   Does the patient have one of the following disease processes/diagnoses(primary or secondary)? General Surgery   Week 2 attempt successful? No   Unsuccessful attempts Attempt 1            Cora Bullock Registered Nurse

## 2023-07-29 NOTE — PROGRESS NOTES
General Surgery/Colorectal Surgery Note    Patient Name:  Mat Middleton  YOB: 2000  [id number]    Referring Provider: No ref. provider found      Patient Care Team:  Canelo Bowers MD as PCP - General (Internal Medicine)    Chief complaint follow-up    Subjective .     History of present illness:    Status post incision and drainage of right axilla abscess 7/12/2023.    He comes in for follow-up.  No fever.      History:  No past medical history on file.    Past Surgical History:   Procedure Laterality Date    ANKLE OPEN REDUCTION INTERNAL FIXATION Right     INCISION AND DRAINAGE ABSCESS Right 7/12/2023    Procedure: INCISION AND DRAINAGE ABSCESS right axilla;  Surgeon: Edgardo Forbes MD;  Location: [facility] MAIN OR;  Service: General;  Laterality: Right;    KNEE ACL RECONSTRUCTION Right     KNEE ARTHROSCOPY W/ MENISCAL REPAIR Right        Family History   Problem Relation Age of Onset    Hypertension Father     Diabetes Maternal Grandfather     Hypertension Maternal Grandfather     Diabetes Cousin        Social History     Tobacco Use    Smoking status: Every Day     Packs/day: 0.50     Years: 3.00     Pack years: 1.50     Types: Cigarettes    Smokeless tobacco: Never   Vaping Use    Vaping Use: Never used   Substance Use Topics    Alcohol use: Yes     Comment: occasional    Drug use: Not Currently     Types: Marijuana       Review of Systems  All systems were reviewed and negative except for:   Review of Systems   Constitutional: Negative for chills, fever and unexpected weight loss.   HENT: Negative for congestion, nosebleeds and voice change.    Eyes: Negative for blurred vision, double vision and discharge.   Respiratory: Negative for apnea, chest tightness and shortness of breath.    Cardiovascular: Negative for chest pain and leg swelling.   Gastrointestinal:        See HPI   Endocrine: Negative for cold intolerance and heat intolerance.   Genitourinary: Negative for dysuria,  "hematuria and urgency.   Musculoskeletal: Negative for back pain, joint swelling and neck pain.   Skin: Negative for color change and dry skin.   Neurological: Negative for dizziness and confusion.   Hematological: Negative for adenopathy.   Psychiatric/Behavioral: Negative for agitation and behavioral problems.     MEDS:  Prior to Admission medications    Medication Sig Start Date End Date Taking? Authorizing Provider   ibuprofen (ADVIL,MOTRIN) 800 MG tablet Take 1 tablet by mouth Every 6 (Six) Hours As Needed for Mild Pain. 7/8/23  Yes Kacie Crow APRN   mupirocin (BACTROBAN) 2 % ointment Apply 1 application  topically to the appropriate area as directed 3 (Three) Times a Day. 7/8/23  Yes ProviderRanda MD   nicotine (NICODERM CQ) 14 MG/24HR patch Place 1 patch on the skin as directed by provider Daily for 30 days. 7/15/23 8/14/23 Yes Canelo Bowers MD        Allergies:  Patient has no known allergies.    Objective     Vital Signs        Physical Exam: Right axilla I&D site without evidence of infection        Results Review:   {Results Review:[id number]::\"I reviewed the patient's new clinical results.\"    LABS/IMAGING:  Results for orders placed or performed during the hospital encounter of 07/11/23   Blood Culture - Blood, Arm, Left    Specimen: Arm, Left; Blood   Result Value Ref Range    Blood Culture No growth at 5 days    Blood Culture - Blood, Arm, Left    Specimen: Arm, Left; Blood   Result Value Ref Range    Blood Culture No growth at 5 days    Wound Culture - Wound, Axilla, Right    Specimen: Axilla, Right; Wound   Result Value Ref Range    Wound Culture Scant growth (1+) Staphylococcus aureus, MRSA (A)     Gram Stain No organisms seen    Comprehensive Metabolic Panel    Specimen: Blood   Result Value Ref Range    Glucose 71 65 - 99 mg/dL    BUN 18 6 - 20 mg/dL    Creatinine 1.00 0.76 - 1.27 mg/dL    Sodium 139 136 - 145 mmol/L    Potassium 3.1 (L) 3.5 - 5.2 mmol/L    Chloride 101 98 - 107 " mmol/L    CO2 23.8 22.0 - 29.0 mmol/L    Calcium 9.3 8.6 - 10.5 mg/dL    Total Protein 7.5 6.0 - 8.5 g/dL    Albumin 4.6 3.5 - 5.2 g/dL    ALT (SGPT) 21 1 - 41 U/L    AST (SGOT) 21 1 - 40 U/L    Alkaline Phosphatase 76 39 - 117 U/L    Total Bilirubin 0.2 0.0 - 1.2 mg/dL    Globulin 2.9 gm/dL    A/G Ratio 1.6 g/dL    BUN/Creatinine Ratio 18.0 7.0 - 25.0    Anion Gap 14.2 5.0 - 15.0 mmol/L    eGFR 107.1 >60.0 mL/min/1.73   Lactic Acid, Plasma    Specimen: Blood   Result Value Ref Range    Lactate 2.7 (C) 0.5 - 2.0 mmol/L   Sedimentation Rate    Specimen: Blood   Result Value Ref Range    Sed Rate 9 0 - 15 mm/hr   C-reactive Protein    Specimen: Blood   Result Value Ref Range    C-Reactive Protein 1.79 (H) 0.00 - 0.50 mg/dL   CBC Auto Differential    Specimen: Blood   Result Value Ref Range    WBC 7.14 3.40 - 10.80 10*3/mm3    RBC 4.65 4.14 - 5.80 10*6/mm3    Hemoglobin 13.7 13.0 - 17.7 g/dL    Hematocrit 40.4 37.5 - 51.0 %    MCV 86.9 79.0 - 97.0 fL    MCH 29.5 26.6 - 33.0 pg    MCHC 33.9 31.5 - 35.7 g/dL    RDW 12.6 12.3 - 15.4 %    RDW-SD 39.8 37.0 - 54.0 fl    MPV 9.8 6.0 - 12.0 fL    Platelets 254 140 - 450 10*3/mm3    Neutrophil % 52.7 42.7 - 76.0 %    Lymphocyte % 34.9 19.6 - 45.3 %    Monocyte % 9.4 5.0 - 12.0 %    Eosinophil % 2.4 0.3 - 6.2 %    Basophil % 0.3 0.0 - 1.5 %    Immature Grans % 0.3 0.0 - 0.5 %    Neutrophils, Absolute 3.77 1.70 - 7.00 10*3/mm3    Lymphocytes, Absolute 2.49 0.70 - 3.10 10*3/mm3    Monocytes, Absolute 0.67 0.10 - 0.90 10*3/mm3    Eosinophils, Absolute 0.17 0.00 - 0.40 10*3/mm3    Basophils, Absolute 0.02 0.00 - 0.20 10*3/mm3    Immature Grans, Absolute 0.02 0.00 - 0.05 10*3/mm3    nRBC 0.0 0.0 - 0.2 /100 WBC   STAT Lactic Acid, Reflex    Specimen: Blood   Result Value Ref Range    Lactate 0.6 0.5 - 2.0 mmol/L   Renal Function Panel    Specimen: Blood   Result Value Ref Range    Glucose 96 65 - 99 mg/dL    BUN 16 6 - 20 mg/dL    Creatinine 0.88 0.76 - 1.27 mg/dL    Sodium 138 136 -  145 mmol/L    Potassium 3.8 3.5 - 5.2 mmol/L    Chloride 108 (H) 98 - 107 mmol/L    CO2 19.0 (L) 22.0 - 29.0 mmol/L    Calcium 8.3 (L) 8.6 - 10.5 mg/dL    Albumin 3.7 3.5 - 5.2 g/dL    Phosphorus 3.1 2.5 - 4.5 mg/dL    Anion Gap 11.0 5.0 - 15.0 mmol/L    BUN/Creatinine Ratio 18.2 7.0 - 25.0    eGFR 122.4 >60.0 mL/min/1.73   Vancomycin, Trough This is a timed trough level. Make sure vancomycin level is not infusing when this level is collected.    Specimen: Blood   Result Value Ref Range    Vancomycin Trough 30.60 (C) 5.00 - 20.00 mcg/mL   Basic Metabolic Panel    Specimen: Blood   Result Value Ref Range    Glucose 98 65 - 99 mg/dL    BUN 16 6 - 20 mg/dL    Creatinine 0.75 (L) 0.76 - 1.27 mg/dL    Sodium 135 (L) 136 - 145 mmol/L    Potassium 4.5 3.5 - 5.2 mmol/L    Chloride 106 98 - 107 mmol/L    CO2 19.7 (L) 22.0 - 29.0 mmol/L    Calcium 8.5 (L) 8.6 - 10.5 mg/dL    BUN/Creatinine Ratio 21.3 7.0 - 25.0    Anion Gap 9.3 5.0 - 15.0 mmol/L    eGFR 128.4 >60.0 mL/min/1.73   CBC Auto Differential    Specimen: Blood   Result Value Ref Range    WBC 6.86 3.40 - 10.80 10*3/mm3    RBC 4.87 4.14 - 5.80 10*6/mm3    Hemoglobin 14.4 13.0 - 17.7 g/dL    Hematocrit 44.9 37.5 - 51.0 %    MCV 92.2 79.0 - 97.0 fL    MCH 29.6 26.6 - 33.0 pg    MCHC 32.1 31.5 - 35.7 g/dL    RDW 12.3 12.3 - 15.4 %    RDW-SD 41.5 37.0 - 54.0 fl    MPV 10.6 6.0 - 12.0 fL    Platelets 245 140 - 450 10*3/mm3    Neutrophil % 39.1 (L) 42.7 - 76.0 %    Lymphocyte % 48.4 (H) 19.6 - 45.3 %    Monocyte % 7.9 5.0 - 12.0 %    Eosinophil % 3.5 0.3 - 6.2 %    Basophil % 0.7 0.0 - 1.5 %    Immature Grans % 0.4 0.0 - 0.5 %    Neutrophils, Absolute 2.68 1.70 - 7.00 10*3/mm3    Lymphocytes, Absolute 3.32 (H) 0.70 - 3.10 10*3/mm3    Monocytes, Absolute 0.54 0.10 - 0.90 10*3/mm3    Eosinophils, Absolute 0.24 0.00 - 0.40 10*3/mm3    Basophils, Absolute 0.05 0.00 - 0.20 10*3/mm3    Immature Grans, Absolute 0.03 0.00 - 0.05 10*3/mm3    nRBC 0.0 0.0 - 0.2 /100 WBC   Green Top  (Gel)   Result Value Ref Range    Extra Tube Hold for add-ons.    Lavender Top   Result Value Ref Range    Extra Tube hold for add-on    Gold Top - SST   Result Value Ref Range    Extra Tube Hold for add-ons.    Light Blue Top   Result Value Ref Range    Extra Tube Hold for add-ons.         Result Review :     Assessment & Plan     Status post incision and drainage of right axilla abscess 7/12/2023.    I reviewed his drain intact.  I explained to him he will have some drainage and this will slowly resolve.  Contact me for fever or worsening redness.  Okay to shower.  Follow-up as needed.  All questions answered.  He agrees with the plan.  Thank you for the consult.              This document has been electronically signed by Edgardo Forbes MD  July 29, 2023 09:15 EDT

## 2023-07-31 ENCOUNTER — READMISSION MANAGEMENT (OUTPATIENT)
Dept: CALL CENTER | Facility: HOSPITAL | Age: 23
End: 2023-07-31
Payer: COMMERCIAL

## 2024-02-12 ENCOUNTER — HOSPITAL ENCOUNTER (EMERGENCY)
Facility: HOSPITAL | Age: 24
Discharge: HOME | End: 2024-02-12
Attending: EMERGENCY MEDICINE | Admitting: EMERGENCY MEDICINE
Payer: COMMERCIAL

## 2024-02-12 VITALS
SYSTOLIC BLOOD PRESSURE: 134 MMHG | OXYGEN SATURATION: 96 % | TEMPERATURE: 98.6 F | DIASTOLIC BLOOD PRESSURE: 81 MMHG | HEART RATE: 80 BPM | RESPIRATION RATE: 18 BRPM

## 2024-02-12 DIAGNOSIS — H92.02 LEFT EAR PAIN: Primary | ICD-10-CM

## 2024-02-12 PROCEDURE — 99282 EMERGENCY DEPT VISIT SF MDM: CPT

## 2024-02-12 RX ORDER — KETOROLAC TROMETHAMINE 10 MG/1
10 TABLET, FILM COATED ORAL EVERY 6 HOURS PRN
Qty: 20 TABLET | Refills: 0 | Status: SHIPPED | OUTPATIENT
Start: 2024-02-12

## 2024-02-12 RX ORDER — KETOROLAC TROMETHAMINE 30 MG/ML
60 INJECTION, SOLUTION INTRAMUSCULAR; INTRAVENOUS ONCE
Status: DISCONTINUED | OUTPATIENT
Start: 2024-02-12 | End: 2024-02-12

## 2024-02-12 NOTE — Clinical Note
[facility] EMERGENCY ROOM  [address]  [place] [address]  Phone: [phone]    Mat Middleton was seen and treated in our emergency department on 2/12/2024.  He may return to work on 02/14/2024.         Thank you for choosing [facility].    Alanna Santos APRN

## 2024-02-13 NOTE — ED PROVIDER NOTES
Time: 9:13 PM EST  Date of encounter:  2/12/2024  Independent Historian/Clinical History and Information was obtained by:   Patient    History is limited by: N/A    Chief Complaint: Earache      History of Present Illness:  Patient is a 23 y.o. year old male who presents to the emergency department for evaluation of left-sided earache for about 2 weeks.  Patient states he was grinding something 2 weeks ago and he feels like a piece of metal shaving flew into his ear and his eardrum.  Since then he states he has had intermittent pain.  The last 2 days has become more persistent and severe.  He states it is in his left ear and shoots down into his jaw and his neck.  No fever.  No hearing loss.  No other injury or symptoms    HPI    Patient Care Team  Primary Care Provider: Canelo Bowers MD    Past Medical History:     No Known Allergies  History reviewed. No pertinent past medical history.  Past Surgical History:   Procedure Laterality Date    ANKLE OPEN REDUCTION INTERNAL FIXATION Right     INCISION AND DRAINAGE ABSCESS Right 7/12/2023    Procedure: INCISION AND DRAINAGE ABSCESS right axilla;  Surgeon: Edgardo Forbes MD;  Location: [facility];  Service: General;  Laterality: Right;    KNEE ACL RECONSTRUCTION Right     KNEE ARTHROSCOPY W/ MENISCAL REPAIR Right      Family History   Problem Relation Age of Onset    Hypertension Father     Diabetes Maternal Grandfather     Hypertension Maternal Grandfather     Diabetes Cousin        Home Medications:  Prior to Admission medications    Medication Sig Start Date End Date Taking? Authorizing Provider   ibuprofen (ADVIL,MOTRIN) 800 MG tablet Take 1 tablet by mouth Every 6 (Six) Hours As Needed for Mild Pain. 7/8/23   Kacie Crow APRN   mupirocin (BACTROBAN) 2 % ointment Apply 1 application  topically to the appropriate area as directed 3 (Three) Times a Day. 7/8/23   Provider, MD Randa        Social History:   Social History     Tobacco Use     Smoking status: Every Day     Packs/day: 0.50     Years: 3.00     Additional pack years: 0.00     Total pack years: 1.50     Types: Cigarettes    Smokeless tobacco: Never   Vaping Use    Vaping Use: Never used   Substance Use Topics    Alcohol use: Yes     Comment: daily, 1/5 today    Drug use: Not Currently     Types: Marijuana         Review of Systems:  Review of Systems   Constitutional:  Negative for fever.   HENT:  Positive for ear pain. Negative for ear discharge, facial swelling and hearing loss.    Eyes: Negative.    Gastrointestinal:  Negative for vomiting.   Musculoskeletal:  Negative for neck pain.   Skin:  Negative for rash.   Neurological:  Negative for dizziness and headaches.   Hematological:  Negative for adenopathy.   Psychiatric/Behavioral:  Positive for agitation.    All other systems reviewed and are negative.       Physical Exam:  /81 (BP Location: Left arm, Patient Position: Sitting)   Pulse 80   Temp 98.6 °F (37 °C) (Oral)   Resp 18   SpO2 96%     Physical Exam  Vitals and nursing note reviewed.   HENT:      Head: Atraumatic.      Right Ear: Tympanic membrane, ear canal and external ear normal.      Left Ear: Tympanic membrane and external ear normal.      Ears:      Comments: Mild erythema in canal.  No swelling and no foreign body.  No discharge     Nose: Nose normal.      Mouth/Throat:      Mouth: Mucous membranes are moist.   Eyes:      Conjunctiva/sclera: Conjunctivae normal.   Cardiovascular:      Rate and Rhythm: Normal rate and regular rhythm.   Pulmonary:      Effort: Pulmonary effort is normal.      Breath sounds: Normal breath sounds.   Musculoskeletal:         General: Normal range of motion.      Cervical back: Normal range of motion.   Skin:     General: Skin is warm and dry.   Neurological:      Mental Status: He is alert and oriented to person, place, and time.   Psychiatric:      Comments: Patient extremely agitated and restless            Medical Decision  Making:      Comorbidities that affect care:    Smoking    External Notes reviewed:    Previous Clinic Note: Patient last seen in the clinic back in August for a hospital follow-up by PCP      The following orders were placed and all results were independently analyzed by me:  No orders of the defined types were placed in this encounter.      Medications Given in the Emergency Department:  Medications - No data to display     ED Course:         Labs:    Lab Results (last 24 hours)       ** No results found for the last 24 hours. **             Imaging:    No Radiology Exams Resulted Within Past 24 Hours      Differential Diagnosis and Discussion:    Ear Pain: Differential diagnosis includes but is not limited to this externa, otitis media, foreign body, bullous myringitis, furuncles, herpes zoster, mastoiditis, trauma, and tumors        MDM  Number of Diagnoses or Management Options  Left ear pain  Diagnosis management comments: I have explained the patient´s condition, diagnoses and treatment plan based on the information available to me at this time. I have answered questions and addressed any concerns. The patient has a good  understanding of the patient´s diagnosis, condition, and treatment plan as can be expected at this point. The vital signs have been stable. The patient´s condition is stable and appropriate for discharge from the emergency department.      The patient will pursue further outpatient evaluation with the primary care physician or other designated or consulting physician as outlined in the discharge instructions. They are agreeable to this plan of care and follow-up instructions have been explained in detail. The patient has received these instructions in written format and have expressed an understanding of the discharge instructions. The patient is aware that any significant change in condition or worsening of symptoms should prompt an immediate return to this or the closest emergency  department or call to 911.       Amount and/or Complexity of Data Reviewed  Tests in the medicine section of CPT®: ordered    Risk of Complications, Morbidity, and/or Mortality  Presenting problems: low  Management options: low    Patient Progress  Patient progress: stable           Patient Care Considerations:    NARCOTICS: I considered prescribing opiate pain medication as an outpatient, however no signs of trauma or emergent abnormality      Consultants/Shared Management Plan:    None    Social Determinants of Health:    Patient is independent, reliable, and has access to care.       Disposition and Care Coordination:    Discharged: The patient is suitable and stable for discharge with no need for consideration of admission.    I have explained the patient´s condition, diagnoses and treatment plan based on the information available to me at this time. I have answered questions and addressed any concerns. The patient has a good  understanding of the patient´s diagnosis, condition, and treatment plan as can be expected at this point. The vital signs have been stable. The patient´s condition is stable and appropriate for discharge from the emergency department.      The patient will pursue further outpatient evaluation with the primary care physician or other designated or consulting physician as outlined in the discharge instructions. They are agreeable to this plan of care and follow-up instructions have been explained in detail. The patient has received these instructions in written format and has expressed an understanding of the discharge instructions. The patient is aware that any significant change in condition or worsening of symptoms should prompt an immediate return to this or the closest emergency department or call to 911.  I have explained discharge medications and the need for follow up with the patient/caretakers. This was also printed in the discharge instructions. Patient was discharged with the  following medications and follow up:      Medication List        New Prescriptions      ketorolac 10 MG tablet  Commonly known as: TORADOL  Take 1 tablet by mouth Every 6 (Six) Hours As Needed for Moderate Pain, Severe Pain or Mild Pain.     neomycin-polymyxin-hydrocortisone 3.5-66200-7 otic solution  Commonly known as: CORTISPORIN  Administer 3 drops into the left ear 4 (Four) Times a Day.               Where to Get Your Medications        These medications were sent to [id number] - [place], [place] [address] AT [facility] OF [address] & [place] - [phone]  - [phone] FX  [address] [facility], [facility] [address]      Phone: [phone]   ketorolac 10 MG tablet  neomycin-polymyxin-hydrocortisone 3.5-35201-5 otic solution      Canelo Bowers MD  [address]  [address]  [facility] [address] [phone]      As needed       Final diagnoses:   Left ear pain        ED Disposition       ED Disposition   Discharge    Condition   Stable    Comment   --               This medical record created using voice recognition software.             Alanna Santos, APRN  02/13/24 0254

## 2024-03-27 ENCOUNTER — OFFICE VISIT (OUTPATIENT)
Dept: INTERNAL MEDICINE | Facility: CLINIC | Age: 24
End: 2024-03-27
Payer: COMMERCIAL

## 2024-03-27 ENCOUNTER — PRIOR AUTHORIZATION (OUTPATIENT)
Dept: INTERNAL MEDICINE | Facility: CLINIC | Age: 24
End: 2024-03-27

## 2024-03-27 ENCOUNTER — PATIENT ROUNDING (BHMG ONLY) (OUTPATIENT)
Dept: INTERNAL MEDICINE | Facility: CLINIC | Age: 24
End: 2024-03-27
Payer: COMMERCIAL

## 2024-03-27 VITALS
HEART RATE: 68 BPM | OXYGEN SATURATION: 96 % | SYSTOLIC BLOOD PRESSURE: 136 MMHG | RESPIRATION RATE: 16 BRPM | DIASTOLIC BLOOD PRESSURE: 77 MMHG | WEIGHT: 196.4 LBS | HEIGHT: 70 IN | BODY MASS INDEX: 28.12 KG/M2 | TEMPERATURE: 97.7 F

## 2024-03-27 DIAGNOSIS — F33.1 MODERATE EPISODE OF RECURRENT MAJOR DEPRESSIVE DISORDER: ICD-10-CM

## 2024-03-27 DIAGNOSIS — Z76.89 ESTABLISHING CARE WITH NEW DOCTOR, ENCOUNTER FOR: Primary | ICD-10-CM

## 2024-03-27 DIAGNOSIS — F17.210 CIGARETTE NICOTINE DEPENDENCE WITHOUT COMPLICATION: ICD-10-CM

## 2024-03-27 DIAGNOSIS — G89.29 CHRONIC BILATERAL LOW BACK PAIN WITH LEFT-SIDED SCIATICA: ICD-10-CM

## 2024-03-27 DIAGNOSIS — M54.42 CHRONIC BILATERAL LOW BACK PAIN WITH LEFT-SIDED SCIATICA: ICD-10-CM

## 2024-03-27 PROBLEM — M54.9 BACK PAIN: Status: ACTIVE | Noted: 2024-03-27

## 2024-03-27 RX ORDER — DULOXETIN HYDROCHLORIDE 30 MG/1
30 CAPSULE, DELAYED RELEASE ORAL DAILY
Qty: 90 CAPSULE | Refills: 2 | Status: SHIPPED | OUTPATIENT
Start: 2024-03-27

## 2024-03-27 RX ORDER — NAPROXEN 500 MG/1
500 TABLET ORAL 2 TIMES DAILY WITH MEALS
Qty: 60 TABLET | Refills: 2 | Status: SHIPPED | OUTPATIENT
Start: 2024-03-27 | End: 2024-03-27

## 2024-03-27 RX ORDER — TIZANIDINE HYDROCHLORIDE 2 MG/1
2 CAPSULE, GELATIN COATED ORAL 3 TIMES DAILY PRN
Qty: 90 CAPSULE | Refills: 2 | Status: SHIPPED | OUTPATIENT
Start: 2024-03-27

## 2024-03-27 RX ORDER — NAPROXEN 500 MG/1
500 TABLET ORAL 2 TIMES DAILY PRN
Qty: 60 TABLET | Refills: 2 | Status: SHIPPED | OUTPATIENT
Start: 2024-03-27

## 2024-03-28 ENCOUNTER — TELEPHONE (OUTPATIENT)
Dept: INTERNAL MEDICINE | Facility: CLINIC | Age: 24
End: 2024-03-28
Payer: COMMERCIAL

## 2024-03-28 NOTE — TELEPHONE ENCOUNTER
Pharmacy Name: [id number] - [place], [place] - [address] AT [facility] OF [address] & [place] - [phone] [facility] [phone]      Pharmacy representative name: ANGÉLICA     Pharmacy representative phone number: [phone]    What medication are you calling in regards to:     TiZANidine (Zanaflex) 2 MG capsule       What question does the pharmacy have: INSURANCE COVERS THIS AS TABLETS. NEEDING TO SWITCH TO TABLET FORM.     Who is the provider that prescribed the medication: VANHOOSE

## 2024-03-29 NOTE — PROGRESS NOTES
".\"A [employer] message has been sent to the patient for PATIENT ROUNDING with [facility]\"  "

## 2024-03-29 NOTE — TELEPHONE ENCOUNTER
They are requesting that the tablets for Tizanidine be sent in instead of capsule since the tablets are covered

## 2024-03-29 NOTE — TELEPHONE ENCOUNTER
OK to give verbal to switch to tablet.  We discussed it yesterday, so this may be a duplicate message.  If there are any issues, let me know.

## 2024-04-08 ENCOUNTER — TELEPHONE (OUTPATIENT)
Dept: INTERNAL MEDICINE | Facility: CLINIC | Age: 24
End: 2024-04-08
Payer: COMMERCIAL

## 2024-05-24 ENCOUNTER — HOSPITAL ENCOUNTER (EMERGENCY)
Facility: HOSPITAL | Age: 24
Discharge: HOME | End: 2024-05-24
Attending: EMERGENCY MEDICINE
Payer: COMMERCIAL

## 2024-05-24 VITALS
TEMPERATURE: 97.8 F | SYSTOLIC BLOOD PRESSURE: 145 MMHG | DIASTOLIC BLOOD PRESSURE: 82 MMHG | RESPIRATION RATE: 18 BRPM | HEIGHT: 71 IN | OXYGEN SATURATION: 100 % | BODY MASS INDEX: 26.54 KG/M2 | HEART RATE: 98 BPM | WEIGHT: 189.6 LBS

## 2024-05-24 DIAGNOSIS — K02.9 DENTAL CARIES: ICD-10-CM

## 2024-05-24 DIAGNOSIS — K04.7 DENTAL ABSCESS: ICD-10-CM

## 2024-05-24 DIAGNOSIS — K02.9 PAIN DUE TO DENTAL CARIES: Primary | ICD-10-CM

## 2024-05-24 DIAGNOSIS — K08.89 PAIN, DENTAL: ICD-10-CM

## 2024-05-24 PROCEDURE — 99283 EMERGENCY DEPT VISIT LOW MDM: CPT

## 2024-05-24 RX ORDER — PENICILLIN V POTASSIUM 250 MG/1
500 TABLET ORAL ONCE
Status: COMPLETED | OUTPATIENT
Start: 2024-05-24 | End: 2024-05-24

## 2024-05-24 RX ORDER — PENICILLIN V POTASSIUM 500 MG/1
500 TABLET ORAL 4 TIMES DAILY
Qty: 28 TABLET | Refills: 0 | Status: SHIPPED | OUTPATIENT
Start: 2024-05-24 | End: 2024-05-31

## 2024-05-24 RX ORDER — CHLORHEXIDINE GLUCONATE ORAL RINSE 1.2 MG/ML
SOLUTION DENTAL
Qty: 473 ML | Refills: 0 | Status: SHIPPED | OUTPATIENT
Start: 2024-05-24

## 2024-05-24 RX ORDER — NAPROXEN 500 MG/1
500 TABLET ORAL 2 TIMES DAILY PRN
Qty: 60 TABLET | Refills: 0 | Status: SHIPPED | OUTPATIENT
Start: 2024-05-24

## 2024-05-24 RX ORDER — HYDROCODONE BITARTRATE AND ACETAMINOPHEN 7.5; 325 MG/1; MG/1
1 TABLET ORAL ONCE
Status: COMPLETED | OUTPATIENT
Start: 2024-05-24 | End: 2024-05-24

## 2024-05-24 RX ADMIN — BENZOCAINE 1 APPLICATION: 200 GEL DENTAL; ORAL; PERIODONTAL at 19:59

## 2024-05-24 RX ADMIN — PENICILLIN V POTASSIUM 500 MG: 250 TABLET, FILM COATED ORAL at 19:52

## 2024-05-24 RX ADMIN — HYDROCODONE BITARTRATE AND ACETAMINOPHEN 1 TABLET: 7.5; 325 TABLET ORAL at 19:52

## 2024-05-24 NOTE — ED PROVIDER NOTES
"Time: 7:11 PM EDT  Date of encounter:  5/24/2024  Independent Historian/Clinical History and Information was obtained by:   Patient    History is limited by: N/A    Chief Complaint: DENTAL PAIN      History of Present Illness:      The patient presents to the emergency department states that he is got \"3 dry sockets\".  He reports that he has 2 upper wisdom teeth and 1 lower right molar that are broken and having pain and swelling.  He states has had no drainage.  He denies any foul taste.  He reports no recent fevers.  He states this is gone on for \"a very long time\".  He states that he is been seen at the urgent care and has been placed on naproxen and antibiotics previously but states that he is on a waiting list to be seen at [facility] dent[facility].  On exam he has no upper appreciable abscess that can be drained at this time.  He has no significant facial swelling or redness.  He is able to open and close his mouth without difficulties.      History provided by:  Patient   used: No        Patient Care Team  Primary Care Provider: Demarcus Montoya MD    Past Medical History:     No Known Allergies  Past Medical History:   Diagnosis Date    Depression      Past Surgical History:   Procedure Laterality Date    ANKLE OPEN REDUCTION INTERNAL FIXATION Right     INCISION AND DRAINAGE ABSCESS Right 7/12/2023    Procedure: INCISION AND DRAINAGE ABSCESS right axilla;  Surgeon: Edgardo Forbes MD;  Location: [facility];  Service: General;  Laterality: Right;    KNEE ACL RECONSTRUCTION Right     KNEE ARTHROSCOPY W/ MENISCAL REPAIR Right      Family History   Problem Relation Age of Onset    Hypertension Father     Diabetes Maternal Grandfather     Hypertension Maternal Grandfather     Diabetes Cousin        Home Medications:  Prior to Admission medications    Medication Sig Start Date End Date Taking? Authorizing Provider   acetaminophen (TYLENOL) 500 MG tablet Take 2 tablets by mouth 3 (Three) " Times a Day As Needed (pain). 5/10/24   Gerber Ochoa MD   chlorhexidine (PERIDEX) 0.12 % solution Swish and spit 15 mL twice daily after meals 5/10/24   Gerber Ochoa MD   DULoxetine (Cymbalta) 30 MG capsule Take 1 capsule by mouth Daily. 3/27/24   Demarcus Montoya MD   naproxen (Naprosyn) 500 MG tablet Take 1 tablet by mouth 2 (Two) Times a Day As Needed for Moderate Pain. 3/27/24   Demarcus Montoya MD   tiZANidine (ZANAFLEX) 2 MG tablet Take 1 tablet by mouth 3 (Three) Times a Day As Needed. for muscle spams 4/24/24   Provider, MD Randa   triamcinolone (KENALOG) 0.1 % cream Apply 1 Application topically to the appropriate area as directed 2 (Two) Times a Day. Use for no longer than 1 week 5/10/24   Gerber Ochoa MD        Social History:   Social History     Tobacco Use    Smoking status: Every Day     Current packs/day: 1.00     Average packs/day: 1 pack/day for 6.0 years (6.0 ttl pk-yrs)     Types: Cigarettes     Start date: 5/10/2018    Smokeless tobacco: Never   Vaping Use    Vaping status: Never Used   Substance Use Topics    Alcohol use: Yes     Comment: daily, 1/5 today    Drug use: Not Currently     Types: Marijuana         Review of Systems:  Review of Systems   Constitutional:  Negative for chills and fever.   HENT:  Positive for dental problem. Negative for congestion, drooling, ear pain, facial swelling, sore throat, trouble swallowing and voice change.    Eyes:  Negative for pain.   Respiratory:  Negative for cough, chest tightness and shortness of breath.    Cardiovascular:  Negative for chest pain.   Gastrointestinal:  Negative for abdominal pain, diarrhea, nausea and vomiting.   Genitourinary:  Negative for flank pain and hematuria.   Musculoskeletal:  Negative for back pain, joint swelling, neck pain and neck stiffness.   Skin:  Negative for pallor.   Neurological:  Negative for seizures and headaches.   All other systems reviewed and are negative.       Physical Exam:  /82  "(BP Location: Right arm, Patient Position: Sitting)   Pulse 98   Temp 97.8 °F (36.6 °C) (Oral)   Resp 18   Ht 180.2 cm (70.95\")   Wt 86 kg (189 lb 9.5 oz)   SpO2 100%   BMI 26.48 kg/m²     Physical Exam  Vitals and nursing note reviewed.   Constitutional:       General: He is not in acute distress.     Appearance: Normal appearance. He is not ill-appearing or toxic-appearing.   HENT:      Head: Normocephalic and atraumatic.      Nose: Nose normal.      Mouth/Throat:      Mouth: Mucous membranes are moist.      Pharynx: Oropharynx is clear.   Eyes:      General: No scleral icterus.     Conjunctiva/sclera: Conjunctivae normal.      Pupils: Pupils are equal, round, and reactive to light.   Cardiovascular:      Rate and Rhythm: Normal rate and regular rhythm.      Pulses: Normal pulses.   Pulmonary:      Effort: Pulmonary effort is normal. No respiratory distress.   Abdominal:      General: Abdomen is flat. There is no distension.   Musculoskeletal:         General: Normal range of motion.      Cervical back: Normal range of motion and neck supple. No rigidity or tenderness.   Lymphadenopathy:      Cervical: No cervical adenopathy.   Skin:     General: Skin is warm and dry.      Capillary Refill: Capillary refill takes less than 2 seconds.   Neurological:      General: No focal deficit present.      Mental Status: He is alert and oriented to person, place, and time. Mental status is at baseline.   Psychiatric:         Mood and Affect: Mood normal.         Behavior: Behavior normal.                Procedures:  Procedures      Medical Decision Making:      Comorbidities that affect care:    None    External Notes reviewed:    Previous Clinic Note: Urgent care note from May 10, 2024      The following orders were placed and all results were independently analyzed by me:  No orders of the defined types were placed in this encounter.      Medications Given in the Emergency Department:  Medications   penicillin v " "potassium (VEETID) tablet 500 mg (500 mg Oral Given 5/24/24 1952)   HYDROcodone-acetaminophen (NORCO) 7.5-325 MG per tablet 1 tablet (1 tablet Oral Given 5/24/24 1952)   benzocaine (HURRICAINE/ORAJEL) 20 % jelly (1 Application Mouth/Throat Given 5/24/24 1959)        ED Course:         Labs:    Lab Results (last 24 hours)       ** No results found for the last 24 hours. **             Imaging:    No Radiology Exams Resulted Within Past 24 Hours      Differential Diagnosis and Discussion:    Dental Pain: Differential diagnosis includes but is not limited to dental caries, periodontitis, pericoronitis, peridental abscess, gingival abscess, apthous stomatitis, allergic stomatitis, acute necrotizing ulcerative gingivitis, herpetic stomatitis.      MDM  Number of Diagnoses or Management Options  Dental abscess: established and worsening  Dental caries: established and worsening  Pain due to dental caries: established and worsening  Risk of Complications, Morbidity, and/or Mortality  Presenting problems: low  Diagnostic procedures: low  Management options: low    Patient Progress  Patient progress: stable         Patient Care Considerations:    NARCOTICS: I considered prescribing opiate pain medication as an outpatient, however patient states this is chronic pain that he has been dealing with for a \"very long time.\"      Consultants/Shared Management Plan:    None    Social Determinants of Health:    Patient is independent, reliable, and has access to care.       Disposition and Care Coordination:    Discharged: The patient is suitable and stable for discharge with no need for consideration of admission.    I have explained the patient´s condition, diagnoses and treatment plan based on the information available to me at this time. I have answered questions and addressed any concerns. The patient has a good  understanding of the patient´s diagnosis, condition, and treatment plan as can be expected at this point. The vital " signs have been stable. The patient´s condition is stable and appropriate for discharge from the emergency department.      The patient will pursue further outpatient evaluation with the primary care physician or other designated or consulting physician as outlined in the discharge instructions. They are agreeable to this plan of care and follow-up instructions have been explained in detail. The patient has received these instructions in written format and has expressed an understanding of the discharge instructions. The patient is aware that any significant change in condition or worsening of symptoms should prompt an immediate return to this or the closest emergency department or call to [facility].  I have explained discharge medications and the need for follow up with the patient/caretakers. This was also printed in the discharge instructions. Patient was discharged with the following medications and follow up:      Medication List        New Prescriptions      penicillin v potassium 500 MG tablet  Commonly known as: VEETID  Take 1 tablet by mouth 4 (Four) Times a Day for 7 days.               Where to Get Your Medications        These medications were sent to [facility] #[id number] - [place], [place] - [address] - [phone] [facility] [phone]   [address] [place] [address] [place] [address]      Hours: 24-hours Phone: [phone]   chlorhexidine 0.12 % solution  naproxen 500 MG tablet  penicillin v potassium 500 MG tablet      [facility]  [address] [place] [address]  [phone]  Call   FOR FOLLOW UP    Demarcus Montoya MD  [address] [facility]  [address]  [facility] [address]  [phone]    Call   FOR FOLLOW UP       Final diagnoses:   Pain due to dental caries   Dental caries   Dental abscess        ED Disposition       ED Disposition   Discharge    Condition   Stable    Comment   --               This medical record created using voice recognition  software.             Ruchi Monson, KAN  05/24/24 2003

## 2024-05-24 NOTE — DISCHARGE INSTRUCTIONS
's, drink plenty of fluids.  Take your meds as prescribed.  Complete the antibiotics.  You may also take over-the-counter acetaminophen 975 mg every 4 hours with this medication to help with pain.  You may use over-the-counter analgesic topicals to help with comfort.  Warm compresses and warm salt water gargles several times a day will also help.  Continue to call the [facility] school daily to check on any cancellations and follow-up with them as soon as possible or with a dentist of your choice.  Follow-up with your family doctor next week.  Return to the emergency department immediately for any acutely developing facial swelling or redness, any airway difficulties, any fevers of 101 or greater or any new or worse concerns.

## 2024-08-05 ENCOUNTER — HOSPITAL ENCOUNTER (EMERGENCY)
Facility: HOSPITAL | Age: 24
Discharge: LEFT BEFORE DISCHARGE | End: 2024-08-05
Attending: EMERGENCY MEDICINE | Admitting: EMERGENCY MEDICINE
Payer: COMMERCIAL

## 2024-08-05 VITALS
OXYGEN SATURATION: 98 % | TEMPERATURE: 98.6 F | DIASTOLIC BLOOD PRESSURE: 89 MMHG | SYSTOLIC BLOOD PRESSURE: 105 MMHG | BODY MASS INDEX: 26.23 KG/M2 | HEIGHT: 71 IN | WEIGHT: 187.39 LBS | HEART RATE: 62 BPM | RESPIRATION RATE: 24 BRPM

## 2024-08-05 DIAGNOSIS — K08.89 PAIN, DENTAL: Primary | ICD-10-CM

## 2024-08-05 PROCEDURE — 25010000002 HYDROMORPHONE 1 MG/ML SOLUTION: Performed by: EMERGENCY MEDICINE

## 2024-08-05 PROCEDURE — 99283 EMERGENCY DEPT VISIT LOW MDM: CPT

## 2024-08-05 PROCEDURE — 25810000003 SODIUM CHLORIDE 0.9 % SOLUTION: Performed by: EMERGENCY MEDICINE

## 2024-08-05 PROCEDURE — 96372 THER/PROPH/DIAG INJ SC/IM: CPT

## 2024-08-05 RX ADMIN — HYDROMORPHONE HYDROCHLORIDE 1 MG: 1 INJECTION, SOLUTION INTRAMUSCULAR; INTRAVENOUS; SUBCUTANEOUS at 20:50

## 2024-08-05 RX ADMIN — SODIUM CHLORIDE 1000 ML: 9 INJECTION, SOLUTION INTRAVENOUS at 21:01

## 2024-08-06 NOTE — DISCHARGE INSTRUCTIONS
Take medicine at home as directed prenatal follow-up with [facility].

## 2024-08-06 NOTE — ED PROVIDER NOTES
Time: 8:08 PM EDT  Date of encounter:  8/5/2024  Independent Historian/Clinical History and Information was obtained by:   Patient    History is limited by: N/A    Chief Complaint: Dental pain      History of Present Illness:  Patient is a 24 y.o. year old male who presents to the emergency department for evaluation of had his wisdom teeth pulled out today and states that he is in pain.      Patient Care Team  Primary Care Provider: Demarcus Montoya MD    Past Medical History:     Allergies   Allergen Reactions    Dilaudid [Hydromorphone] Unknown - High Severity     Syncope      Past Medical History:   Diagnosis Date    Depression      Past Surgical History:   Procedure Laterality Date    ANKLE OPEN REDUCTION INTERNAL FIXATION Right     INCISION AND DRAINAGE ABSCESS Right 7/12/2023    Procedure: INCISION AND DRAINAGE ABSCESS right axilla;  Surgeon: Edgardo Forbes MD;  Location: [facility] OR;  Service: General;  Laterality: Right;    KNEE ACL RECONSTRUCTION Right     KNEE ARTHROSCOPY W/ MENISCAL REPAIR Right      Family History   Problem Relation Age of Onset    Hypertension Father     Diabetes Maternal Grandfather     Hypertension Maternal Grandfather     Diabetes Cousin        Home Medications:  Prior to Admission medications    Medication Sig Start Date End Date Taking? Authorizing Provider   acetaminophen (TYLENOL) 500 MG tablet Take 2 tablets by mouth 3 (Three) Times a Day As Needed (pain). 5/10/24   Gerber Ochoa MD   chlorhexidine (PERIDEX) 0.12 % solution Swish and spit 15 mL twice daily after meals 5/24/24   Ruchi Monson APRN   DULoxetine (Cymbalta) 30 MG capsule Take 1 capsule by mouth Daily. 3/27/24   Demarcus Montoya MD   naproxen (Naprosyn) 500 MG tablet Take 1 tablet by mouth 2 (Two) Times a Day As Needed for Moderate Pain. 5/24/24   Ruchi Monson APRN   tiZANidine (ZANAFLEX) 2 MG tablet Take 1 tablet by mouth 3 (Three) Times a Day As Needed. for muscle spams 4/24/24   Provider,  "MD Randa   triamcinolone (KENALOG) 0.1 % cream Apply 1 Application topically to the appropriate area as directed 2 (Two) Times a Day. Use for no longer than 1 week 5/10/24   Gerber Ochoa MD        Social History:   Social History     Tobacco Use    Smoking status: Every Day     Current packs/day: 1.00     Average packs/day: 1 pack/day for 6.2 years (6.2 ttl pk-yrs)     Types: Cigarettes     Start date: 5/10/2018    Smokeless tobacco: Never   Vaping Use    Vaping status: Never Used   Substance Use Topics    Alcohol use: Yes     Comment: daily, 1/5 today    Drug use: Never         Review of Systems:  Review of Systems   HENT:  Positive for dental problem.         Physical Exam:  /89 (BP Location: Right arm, Patient Position: Lying)   Pulse 62   Temp 98.6 °F (37 °C) (Oral)   Resp 24   Ht 180.3 cm (71\")   Wt 85 kg (187 lb 6.3 oz)   SpO2 98%   BMI 26.14 kg/m²        Vital Signs reviewed, nursing note reviewed  Constitutional: Alert, normal weight, normal appearance, no acute distress  HEENT: Normocephalic, atraumatic, packing still in place, no bleeding noted to packing material, patient is sucking out with syringe  Eyes: PERRL, conjunctivae normal, extraocular movements intact  Cardiovascular: Normal rate, regular rhythm, heart sounds normal\  Pulmonary: Effort normal, breath sounds normal  Musculoskeletal: Range of motion normal  Skin: Warm, dry, normal for ethnicity  Neurological: Oriented x 3  Psychiatric/behavioral: Mood normal, thought content normal, judgment normal, behavior normal         Procedures:  Procedures      Medical Decision Making:      Comorbidities that affect care:    None    External Notes reviewed:          The following orders were placed and all results were independently analyzed by me:  No orders of the defined types were placed in this encounter.      Medications Given in the Emergency Department:  Medications   benzocaine (HURRICAINE/ORAJEL) 20 % jelly ( Mouth/Throat " Not Given 8/5/24 2037)   HYDROmorphone (DILAUDID) injection 1 mg (1 mg Intramuscular Given 8/5/24 2050)   sodium chloride 0.9 % bolus 1,000 mL (0 mL Intravenous Stopped 8/5/24 2117)        ED Course:    ED Course as of 08/05/24 2124   Mon Aug 05, 2024   2009 --- PROVIDER IN TRIAGE NOTE ---    The patient was evaluated by Yana coronel in triage. Orders were placed and the patient is currently awaiting disposition.    [AJ]      ED Course User Index  [AJ] Yana Wilkerson PA-C       Labs:    Lab Results (last 24 hours)       ** No results found for the last 24 hours. **             Imaging:    No Radiology Exams Resulted Within Past 24 Hours      Differential Diagnosis and Discussion:    Dental pain, postop pain    Patient given IM Dilaudid here.  He was told that he needs to stay on top of his pain medicine at home.  Needs to follow-up with  of  dental school soon as possible.  Patient was using the syringe to suck out blood, he was told to let packing do this instead.  Patient had a syncopal episode here after getting the pain medicine, blood pressure dropped, became significantly diaphoretic.  IV started and IV fluids started on pressure bag.  Patient got half the bag and decided to leave AGAINST MEDICAL ADVICE.        MDM               Patient Care Considerations:          Consultants/Shared Management Plan:        Social Determinants of Health:          Disposition and Care Coordination:            Final diagnoses:   Pain, dental        ED Disposition       ED Disposition   AMA    Condition   --    Comment   --               This medical record created using voice recognition software.             Ricardo Randolph PA-C  08/05/24 2125

## 2024-08-16 ENCOUNTER — APPOINTMENT (OUTPATIENT)
Dept: CT IMAGING | Facility: HOSPITAL | Age: 24
End: 2024-08-16
Payer: COMMERCIAL

## 2024-08-16 ENCOUNTER — APPOINTMENT (OUTPATIENT)
Dept: ULTRASOUND IMAGING | Facility: HOSPITAL | Age: 24
End: 2024-08-16
Payer: COMMERCIAL

## 2024-08-16 ENCOUNTER — HOSPITAL ENCOUNTER (EMERGENCY)
Facility: HOSPITAL | Age: 24
Discharge: HOME | End: 2024-08-16
Attending: EMERGENCY MEDICINE
Payer: COMMERCIAL

## 2024-08-16 VITALS
OXYGEN SATURATION: 99 % | SYSTOLIC BLOOD PRESSURE: 129 MMHG | WEIGHT: 195.11 LBS | HEART RATE: 67 BPM | RESPIRATION RATE: 18 BRPM | HEIGHT: 71 IN | BODY MASS INDEX: 27.31 KG/M2 | DIASTOLIC BLOOD PRESSURE: 67 MMHG | TEMPERATURE: 98.2 F

## 2024-08-16 DIAGNOSIS — K29.80 DUODENITIS: Primary | ICD-10-CM

## 2024-08-16 LAB
ALBUMIN SERPL-MCNC: 4.2 G/DL (ref 3.5–5.2)
ALBUMIN/GLOB SERPL: 1.3 G/DL
ALP SERPL-CCNC: 70 U/L (ref 39–117)
ALT SERPL W P-5'-P-CCNC: 13 U/L (ref 1–41)
ANION GAP SERPL CALCULATED.3IONS-SCNC: 10.1 MMOL/L (ref 5–15)
AST SERPL-CCNC: 13 U/L (ref 1–40)
BACTERIA UR QL AUTO: ABNORMAL /HPF
BASOPHILS # BLD AUTO: 0.06 10*3/MM3 (ref 0–0.2)
BASOPHILS NFR BLD AUTO: 0.6 % (ref 0–1.5)
BILIRUB SERPL-MCNC: <0.2 MG/DL (ref 0–1.2)
BILIRUB UR QL STRIP: NEGATIVE
BUN SERPL-MCNC: 13 MG/DL (ref 6–20)
BUN/CREAT SERPL: 15.1 (ref 7–25)
CALCIUM SPEC-SCNC: 9.4 MG/DL (ref 8.6–10.5)
CHLORIDE SERPL-SCNC: 103 MMOL/L (ref 98–107)
CLARITY UR: CLEAR
CO2 SERPL-SCNC: 25.9 MMOL/L (ref 22–29)
COLOR UR: YELLOW
CREAT SERPL-MCNC: 0.86 MG/DL (ref 0.76–1.27)
DEPRECATED RDW RBC AUTO: 38.8 FL (ref 37–54)
EGFRCR SERPLBLD CKD-EPI 2021: 122.5 ML/MIN/1.73
EOSINOPHIL # BLD AUTO: 0.21 10*3/MM3 (ref 0–0.4)
EOSINOPHIL NFR BLD AUTO: 2.2 % (ref 0.3–6.2)
ERYTHROCYTE [DISTWIDTH] IN BLOOD BY AUTOMATED COUNT: 12.1 % (ref 12.3–15.4)
FLUAV SUBTYP SPEC NAA+PROBE: NOT DETECTED
FLUBV RNA ISLT QL NAA+PROBE: NOT DETECTED
GLOBULIN UR ELPH-MCNC: 3.2 GM/DL
GLUCOSE SERPL-MCNC: 95 MG/DL (ref 65–99)
GLUCOSE UR STRIP-MCNC: NEGATIVE MG/DL
HCT VFR BLD AUTO: 45.6 % (ref 37.5–51)
HGB BLD-MCNC: 15.4 G/DL (ref 13–17.7)
HGB UR QL STRIP.AUTO: NEGATIVE
HOLD SPECIMEN: NORMAL
HOLD SPECIMEN: NORMAL
HYALINE CASTS UR QL AUTO: ABNORMAL /LPF
IMM GRANULOCYTES # BLD AUTO: 0.03 10*3/MM3 (ref 0–0.05)
IMM GRANULOCYTES NFR BLD AUTO: 0.3 % (ref 0–0.5)
KETONES UR QL STRIP: NEGATIVE
LEUKOCYTE ESTERASE UR QL STRIP.AUTO: ABNORMAL
LIPASE SERPL-CCNC: 35 U/L (ref 13–60)
LYMPHOCYTES # BLD AUTO: 2.6 10*3/MM3 (ref 0.7–3.1)
LYMPHOCYTES NFR BLD AUTO: 27.7 % (ref 19.6–45.3)
MCH RBC QN AUTO: 29.4 PG (ref 26.6–33)
MCHC RBC AUTO-ENTMCNC: 33.8 G/DL (ref 31.5–35.7)
MCV RBC AUTO: 87.2 FL (ref 79–97)
MONOCYTES # BLD AUTO: 0.62 10*3/MM3 (ref 0.1–0.9)
MONOCYTES NFR BLD AUTO: 6.6 % (ref 5–12)
NEUTROPHILS NFR BLD AUTO: 5.88 10*3/MM3 (ref 1.7–7)
NEUTROPHILS NFR BLD AUTO: 62.6 % (ref 42.7–76)
NITRITE UR QL STRIP: NEGATIVE
NRBC BLD AUTO-RTO: 0 /100 WBC (ref 0–0.2)
PH UR STRIP.AUTO: 7 [PH] (ref 5–8)
PLATELET # BLD AUTO: 297 10*3/MM3 (ref 140–450)
PMV BLD AUTO: 10 FL (ref 6–12)
POTASSIUM SERPL-SCNC: 4 MMOL/L (ref 3.5–5.2)
PROT SERPL-MCNC: 7.4 G/DL (ref 6–8.5)
PROT UR QL STRIP: NEGATIVE
RBC # BLD AUTO: 5.23 10*6/MM3 (ref 4.14–5.8)
RBC # UR STRIP: ABNORMAL /HPF
REF LAB TEST METHOD: ABNORMAL
RSV RNA NPH QL NAA+NON-PROBE: NOT DETECTED
SARS-COV-2 RNA RESP QL NAA+PROBE: NOT DETECTED
SODIUM SERPL-SCNC: 139 MMOL/L (ref 136–145)
SP GR UR STRIP: 1.01 (ref 1–1.03)
SQUAMOUS #/AREA URNS HPF: ABNORMAL /HPF
UROBILINOGEN UR QL STRIP: ABNORMAL
WBC # UR STRIP: ABNORMAL /HPF
WBC NRBC COR # BLD AUTO: 9.4 10*3/MM3 (ref 3.4–10.8)
WHOLE BLOOD HOLD COAG: NORMAL
WHOLE BLOOD HOLD SPECIMEN: NORMAL

## 2024-08-16 PROCEDURE — 25510000001 IOPAMIDOL PER 1 ML: Performed by: EMERGENCY MEDICINE

## 2024-08-16 PROCEDURE — 74177 CT ABD & PELVIS W/CONTRAST: CPT

## 2024-08-16 PROCEDURE — 36415 COLL VENOUS BLD VENIPUNCTURE: CPT

## 2024-08-16 PROCEDURE — 96374 THER/PROPH/DIAG INJ IV PUSH: CPT

## 2024-08-16 PROCEDURE — 81001 URINALYSIS AUTO W/SCOPE: CPT

## 2024-08-16 PROCEDURE — 25010000002 KETOROLAC TROMETHAMINE PER 15 MG

## 2024-08-16 PROCEDURE — 87637 SARSCOV2&INF A&B&RSV AMP PRB: CPT

## 2024-08-16 PROCEDURE — 85025 COMPLETE CBC W/AUTO DIFF WBC: CPT

## 2024-08-16 PROCEDURE — 76705 ECHO EXAM OF ABDOMEN: CPT

## 2024-08-16 PROCEDURE — 83690 ASSAY OF LIPASE: CPT

## 2024-08-16 PROCEDURE — 80053 COMPREHEN METABOLIC PANEL: CPT

## 2024-08-16 PROCEDURE — 99285 EMERGENCY DEPT VISIT HI MDM: CPT

## 2024-08-16 RX ORDER — SUCRALFATE 1 G/1
1 TABLET ORAL 4 TIMES DAILY
Qty: 21 TABLET | Refills: 0 | Status: SHIPPED | OUTPATIENT
Start: 2024-08-16

## 2024-08-16 RX ORDER — KETOROLAC TROMETHAMINE 15 MG/ML
15 INJECTION, SOLUTION INTRAMUSCULAR; INTRAVENOUS ONCE
Status: COMPLETED | OUTPATIENT
Start: 2024-08-16 | End: 2024-08-16

## 2024-08-16 RX ORDER — OMEPRAZOLE 40 MG/1
40 CAPSULE, DELAYED RELEASE ORAL DAILY
Qty: 14 CAPSULE | Refills: 0 | Status: SHIPPED | OUTPATIENT
Start: 2024-08-16

## 2024-08-16 RX ORDER — ALUMINA, MAGNESIA, AND SIMETHICONE 2400; 2400; 240 MG/30ML; MG/30ML; MG/30ML
15 SUSPENSION ORAL ONCE
Status: COMPLETED | OUTPATIENT
Start: 2024-08-16 | End: 2024-08-16

## 2024-08-16 RX ORDER — SODIUM CHLORIDE 0.9 % (FLUSH) 0.9 %
10 SYRINGE (ML) INJECTION AS NEEDED
Status: DISCONTINUED | OUTPATIENT
Start: 2024-08-16 | End: 2024-08-17 | Stop reason: HOSPADM

## 2024-08-16 RX ADMIN — IOPAMIDOL 100 ML: 755 INJECTION, SOLUTION INTRAVENOUS at 21:55

## 2024-08-16 RX ADMIN — ALUMINUM HYDROXIDE, MAGNESIUM HYDROXIDE, AND DIMETHICONE 15 ML: 400; 400; 40 SUSPENSION ORAL at 22:27

## 2024-08-16 RX ADMIN — KETOROLAC TROMETHAMINE 15 MG: 15 INJECTION, SOLUTION INTRAMUSCULAR; INTRAVENOUS at 21:40

## 2024-08-16 NOTE — ED PROVIDER NOTES
Time: 6:25 PM EDT  Date of encounter:  8/16/2024  Independent Historian/Clinical History and Information was obtained by:   Patient    History is limited by: N/A    Chief Complaint: Abdominal pain      History of Present Illness:  Patient is a 24 y.o. year old male who presents to the emergency department for evaluation of epigastric abdominal pain. Patient states that he had a similar issue about two weeks ago which got better after a week. Pain started again this week. He had nausea, vomiting, and fever on Tuesday and Wednesday. He only reports abdominal pain today.     Patient Care Team  Primary Care Provider: Demarcus Montoya MD    Past Medical History:     Allergies   Allergen Reactions    Dilaudid [Hydromorphone] Unknown - High Severity     Syncope      Past Medical History:   Diagnosis Date    Depression      Past Surgical History:   Procedure Laterality Date    ANKLE OPEN REDUCTION INTERNAL FIXATION Right     INCISION AND DRAINAGE ABSCESS Right 7/12/2023    Procedure: INCISION AND DRAINAGE ABSCESS right axilla;  Surgeon: Edgardo Forbes MD;  Location: [facility];  Service: General;  Laterality: Right;    KNEE ACL RECONSTRUCTION Right     KNEE ARTHROSCOPY W/ MENISCAL REPAIR Right      Family History   Problem Relation Age of Onset    Hypertension Father     Diabetes Maternal Grandfather     Hypertension Maternal Grandfather     Diabetes Cousin        Home Medications:  Prior to Admission medications    Medication Sig Start Date End Date Taking? Authorizing Provider   acetaminophen (TYLENOL) 500 MG tablet Take 2 tablets by mouth 3 (Three) Times a Day As Needed (pain). 5/10/24   Gerber Ochoa MD   chlorhexidine (PERIDEX) 0.12 % solution Swish and spit 15 mL twice daily after meals 5/24/24   Ruchi Monson APRN   DULoxetine (Cymbalta) 30 MG capsule Take 1 capsule by mouth Daily. 3/27/24   Demarcus Montoya MD   naproxen (Naprosyn) 500 MG tablet Take 1 tablet by mouth 2 (Two) Times a Day As Needed  "for Moderate Pain. 5/24/24   Ruchi Monson APRN   tiZANidine (ZANAFLEX) 2 MG tablet Take 1 tablet by mouth 3 (Three) Times a Day As Needed. for muscle spams 4/24/24   Provider, MD Randa   triamcinolone (KENALOG) 0.1 % cream Apply 1 Application topically to the appropriate area as directed 2 (Two) Times a Day. Use for no longer than 1 week 5/10/24   Gerber Ochoa MD        Social History:   Social History     Tobacco Use    Smoking status: Every Day     Current packs/day: 1.00     Average packs/day: 1 pack/day for 6.3 years (6.3 ttl pk-yrs)     Types: Cigarettes     Start date: 5/10/2018    Smokeless tobacco: Never   Vaping Use    Vaping status: Never Used   Substance Use Topics    Alcohol use: Yes     Comment: daily, 1/5 today    Drug use: Never         Review of Systems:  Review of Systems   Constitutional:  Positive for fever. Negative for chills.   HENT:  Negative for congestion and ear pain.    Eyes:  Negative for pain.   Respiratory:  Negative for cough and shortness of breath.    Cardiovascular:  Negative for chest pain.   Gastrointestinal:  Positive for abdominal pain, nausea and vomiting. Negative for diarrhea.   Genitourinary:  Negative for dysuria and hematuria.   Musculoskeletal:  Negative for myalgias.   Skin:  Negative for rash.   Neurological:  Negative for dizziness and headaches.        Physical Exam:  /75 (BP Location: Right arm, Patient Position: Sitting)   Pulse 52   Temp 97.6 °F (36.4 °C) (Oral)   Resp 18   Ht 180.3 cm (71\")   Wt 88.5 kg (195 lb 1.7 oz)   SpO2 100%   BMI 27.21 kg/m²     Physical Exam  Vitals and nursing note reviewed.   Constitutional:       Appearance: Normal appearance. He is normal weight.   HENT:      Head: Normocephalic and atraumatic.      Nose: Nose normal.   Eyes:      Extraocular Movements: Extraocular movements intact.      Conjunctiva/sclera: Conjunctivae normal.      Pupils: Pupils are equal, round, and reactive to light.   Cardiovascular:      " Rate and Rhythm: Normal rate and regular rhythm.      Pulses: Normal pulses.      Heart sounds: Normal heart sounds.   Pulmonary:      Effort: Pulmonary effort is normal.      Breath sounds: Normal breath sounds.   Abdominal:      General: Abdomen is flat. Bowel sounds are normal. There is no distension.      Palpations: Abdomen is soft.      Tenderness: There is abdominal tenderness in the epigastric area.   Musculoskeletal:         General: Normal range of motion.      Cervical back: Normal range of motion and neck supple.   Skin:     General: Skin is warm and dry.      Coloration: Skin is not cyanotic.   Neurological:      General: No focal deficit present.      Mental Status: He is alert and oriented to person, place, and time.   Psychiatric:         Attention and Perception: Attention and perception normal.         Mood and Affect: Mood normal.         Behavior: Behavior normal.         Thought Content: Thought content normal.         Judgment: Judgment normal.                  Procedures:  Procedures      Medical Decision Making:      Comorbidities that affect care:    None    External Notes reviewed:    None      The following orders were placed and all results were independently analyzed by me:  Orders Placed This Encounter   Procedures    COVID-19, FLU A/B, RSV PCR 1 HR TAT - Swab, Nasopharynx    US Gallbladder    CT Abdomen Pelvis With Contrast    [place] Draw    Comprehensive Metabolic Panel    Lipase    Urinalysis With Microscopic If Indicated (No Culture) - Urine, Clean Catch    CBC Auto Differential    Urinalysis, Microscopic Only - Urine, Clean Catch    NPO Diet NPO Type: Strict NPO    Undress & Gown    Insert Peripheral IV    CBC & Differential    Green Top (Gel)    Lavender Top    Gold Top - SST    Light Blue Top       Medications Given in the Emergency Department:  Medications   sodium chloride 0.9 % flush 10 mL (has no administration in time range)   ketorolac (TORADOL) injection 15 mg (15 mg  Intravenous Given 8/16/24 2140)   iopamidol (ISOVUE-370) 76 % injection 100 mL (100 mL Intravenous Given 8/16/24 2155)   aluminum-magnesium hydroxide-simethicone (MAALOX MAX) 400-400-40 MG/5ML suspension 15 mL (15 mL Oral Given 8/16/24 2227)        ED Course:    ED Course as of 08/16/24 2254   Fri Aug 16, 2024   1826 --- PROVIDER IN TRIAGE NOTE ---    The patient was evaluated by Yana coronel in triage. Orders were placed and the patient is currently awaiting disposition.    [AJ]      ED Course User Index  [AJ] Yana Wilkerson PA-C       Labs:    Lab Results (last 24 hours)       Procedure Component Value Units Date/Time    CBC & Differential [[id number]]  (Abnormal) Collected: 08/16/24 1829    Specimen: Blood from Arm, Right Updated: 08/16/24 1851    Narrative:      The following orders were created for panel order CBC & Differential.  Procedure                               Abnormality         Status                     ---------                               -----------         ------                     CBC Auto Differential[[id number]]        Abnormal            Final result                 Please view results for these tests on the individual orders.    Comprehensive Metabolic Panel [[id number]] Collected: 08/16/24 1829    Specimen: Blood from Arm, Right Updated: 08/16/24 1917     Glucose 95 mg/dL      BUN 13 mg/dL      Creatinine 0.86 mg/dL      Sodium 139 mmol/L      Potassium 4.0 mmol/L      Comment: Slight hemolysis detected by analyzer. Result may be falsely elevated.        Chloride 103 mmol/L      CO2 25.9 mmol/L      Calcium 9.4 mg/dL      Total Protein 7.4 g/dL      Albumin 4.2 g/dL      ALT (SGPT) 13 U/L      AST (SGOT) 13 U/L      Alkaline Phosphatase 70 U/L      Total Bilirubin <0.2 mg/dL      Globulin 3.2 gm/dL      A/G Ratio 1.3 g/dL      BUN/Creatinine Ratio 15.1     Anion Gap 10.1 mmol/L      eGFR 122.5 mL/min/1.73     Narrative:      GFR Normal >60  Chronic Kidney Disease <60  Kidney  Failure <15      Lipase [[id number]]  (Normal) Collected: 08/16/24 1829    Specimen: Blood from Arm, Right Updated: 08/16/24 1917     Lipase 35 U/L     CBC Auto Differential [[id number]]  (Abnormal) Collected: 08/16/24 1829    Specimen: Blood from Arm, Right Updated: 08/16/24 1851     WBC 9.40 10*3/mm3      RBC 5.23 10*6/mm3      Hemoglobin 15.4 g/dL      Hematocrit 45.6 %      MCV 87.2 fL      MCH 29.4 pg      MCHC 33.8 g/dL      RDW 12.1 %      RDW-SD 38.8 fl      MPV 10.0 fL      Platelets 297 10*3/mm3      Neutrophil % 62.6 %      Lymphocyte % 27.7 %      Monocyte % 6.6 %      Eosinophil % 2.2 %      Basophil % 0.6 %      Immature Grans % 0.3 %      Neutrophils, Absolute 5.88 10*3/mm3      Lymphocytes, Absolute 2.60 10*3/mm3      Monocytes, Absolute 0.62 10*3/mm3      Eosinophils, Absolute 0.21 10*3/mm3      Basophils, Absolute 0.06 10*3/mm3      Immature Grans, Absolute 0.03 10*3/mm3      nRBC 0.0 /100 WBC     Urinalysis With Microscopic If Indicated (No Culture) - Urine, Clean Catch [[id number]]  (Abnormal) Collected: 08/16/24 1910    Specimen: Urine, Clean Catch Updated: 08/16/24 2011     Color, UA Yellow     Appearance, UA Clear     pH, UA 7.0     Specific Gravity, UA 1.014     Glucose, UA Negative     Ketones, UA Negative     Bilirubin, UA Negative     Blood, UA Negative     Protein, UA Negative     Leuk Esterase, UA Trace     Nitrite, UA Negative     Urobilinogen, UA 1.0 E.U./dL    Urinalysis, Microscopic Only - Urine, Clean Catch [[id number]]  (Abnormal) Collected: 08/16/24 1910    Specimen: Urine, Clean Catch Updated: 08/16/24 2013     RBC, UA 0-2 /HPF      WBC, UA 6-10 /HPF      Bacteria, UA None Seen /HPF      Squamous Epithelial Cells, UA 0-2 /HPF      Hyaline Casts, UA None Seen /LPF      Methodology Automated Microscopy    COVID-19, FLU A/B, RSV PCR 1 HR TAT - Swab, Nasopharynx [[id number]]  (Normal) Collected: 08/16/24 2039    Specimen: Swab from Nasopharynx Updated: 08/16/24 2129     COVID19 Not  Detected     Influenza A PCR Not Detected     Influenza B PCR Not Detected     RSV, PCR Not Detected    Narrative:      Fact sheet for providers: https://www.fda.gov/media/579121/download    Fact sheet for patients: https://www.fda.gov/media/529375/download    Test performed by PCR.             Imaging:    CT Abdomen Pelvis With Contrast    Result Date: 8/16/2024  CT ABDOMEN PELVIS W CONTRAST Date of Exam: 8/16/2024 9:54 PM EDT Indication: R sided abd pain. Comparison: None available. Technique: Axial CT images were obtained of the abdomen and pelvis after the uneventful intravenous administration of iodinated contrast. Reconstructed coronal and sagittal images were also obtained. Automated exposure control and iterative construction methods were used. Findings: Lung bases are clear. Abdominal aorta is normal in caliber. Gallbladder is contracted. No biliary obstruction. Solid abdominal organs are normal. The kidneys are nonobstructed. Urinary bladder and prostate gland appear normal. The appendix is normal. Large bowel is normal without evidence of colitis. No small bowel obstruction is seen. The stomach is normal. There is some equivocal thickening of the second and third portions of the duodenum which could reflect mild duodenitis.  No adenopathy or free fluid. Large disc herniations are noted at L4-5 and L5-S1. These can be assessed with outpatient lumbar spine MRI if indicated.     1. Equivocal mild duodenitis, possibly the result of peptic ulcer disease. The remainder of the GI tract is normal including the appendix. 2. Large disc herniations at L4-5 and L5-S1 can be assessed with outpatient lumbar spine MRI if indicated. 3. Otherwise negative. Electronically Signed: Abiodun Nelson MD  8/16/2024 10:13 PM EDT  Workstation ID: [id number]    US Gallbladder    Result Date: 8/16/2024  US GALLBLADDER Date of Exam: 8/16/2024 7:37 PM EDT Indication: Epigastric pain. Comparison: No comparisons available. Technique:  Grayscale and color Doppler ultrasound evaluation of the right upper quadrant was performed. Findings:   visualized portions head and body of pancreas are normal. Distal body and tail are obscured due to bowel gas. The liver has homogeneous echogenicity. No focal hepatic lesions. Right hepatic lobe is mildly enlarged measuring 18.7 cm. The portal and hepatic veins are patent with normal direction flow and normal waveforms. Gallbladder is contracted. Negative sonographic Ochoa sign. Gallbladder wall is mildly thickened likely due to incomplete distention. It measures about 4 mm. The common bile duct is normal in caliber measures 3 mm. The right kidney is sonographically normal.     Impression: Gallbladder is contracted. Wall is slightly thickened due to nondistention. No findings of acute cholecystitis. Borderline enlarged liver. Electronically Signed: Theodora Woody MD  8/16/2024 8:27 PM EDT  Workstation ID: [id number]       Differential Diagnosis and Discussion:    Abdominal Pain: Based on the patient's signs and symptoms, I considered abdominal aortic aneurysm, small bowel obstruction, pancreatitis, acute cholecystitis, acute appendecitis, peptic ulcer disease, gastritis, colitis, endocrine disorders, irritable bowel syndrome and other differential diagnosis an etiology of the patient's abdominal pain.    All labs were reviewed and interpreted by me.  CT scan radiology impression was interpreted by me.  Ultrasound impression was interpreted by me.     MDM     Amount and/or Complexity of Data Reviewed  Clinical lab tests: reviewed  Tests in the radiology section of CPT®: reviewed    Risk of Complications, Morbidity, and/or Mortality  Presenting problems: moderate  Diagnostic procedures: moderate  Management options: moderate    Patient Progress  Patient progress: stable    Patient presents to the emergency department for evaluation of epigastric abdominal pain. Patient states that he had a similar issue about two weeks  ago which got better after a week. Pain started again this week. He had nausea, vomiting, and fever on Tuesday and Wednesday. He only reports abdominal pain today.     On exam, patient is tender to palpation to the epigastric region.    The patient´s CBC that was reviewed and interpreted by me shows no abnormalities of critical concern. Of note, there is no anemia requiring a blood transfusion and the platelet count is acceptable.  The patient´s CMP that was reviewed and interpretted by me shows no abnormalities of critical concern. Of note, the patient´s sodium and potassium are acceptable. The patient´s liver enzymes are unremarkable. The patient´s renal function (creatinine) is preserved. The patient has a normal anion gap.    Lipase within normal limits.    UA is negative for any infection.  COVID, RSV, flu swabs are negative.    CT Abdomen Pelvis With Contrast   Final Result      1. Equivocal mild duodenitis, possibly the result of peptic ulcer disease. The remainder of the GI tract is normal including the appendix.   2. Large disc herniations at L4-5 and L5-S1 can be assessed with outpatient lumbar spine MRI if indicated.   3. Otherwise negative.            Electronically Signed: Abiodun Nelson MD     8/16/2024 10:13 PM EDT     Workstation ID: [id number]      US Gallbladder   Final Result   Impression:   Gallbladder is contracted. Wall is slightly thickened due to nondistention. No findings of acute cholecystitis. Borderline enlarged liver.            Electronically Signed: Theodora Woody MD     8/16/2024 8:27 PM EDT     Workstation ID: [id number]        Discussed the abdominal imaging findings with the patient.  Will discharge the patient with omeprazole and Carafate.  Follow-up with his PCP in 3 to 5 days.    I also discussed the incidental finding of large disc herniations at L4-L5 and L5-S1.  Patient will discuss this with his PCP as well.              Patient Care Considerations:    ANTIBIOTICS: I considered  prescribing antibiotics as an outpatient however no bacterial focus of infection was found.      Consultants/Shared Management Plan:    None    Social Determinants of Health:    Patient is independent, reliable, and has access to care.       Disposition and Care Coordination:    Discharged: The patient is suitable and stable for discharge with no need for consideration of admission.    I have explained the patient´s condition, diagnoses and treatment plan based on the information available to me at this time. I have answered questions and addressed any concerns. The patient has a good  understanding of the patient´s diagnosis, condition, and treatment plan as can be expected at this point. The vital signs have been stable. The patient´s condition is stable and appropriate for discharge from the emergency department.      The patient will pursue further outpatient evaluation with the primary care physician or other designated or consulting physician as outlined in the discharge instructions. They are agreeable to this plan of care and follow-up instructions have been explained in detail. The patient has received these instructions in written format and has expressed an understanding of the discharge instructions. The patient is aware that any significant change in condition or worsening of symptoms should prompt an immediate return to this or the closest emergency department or call to 911.  I have explained discharge medications and the need for follow up with the patient/caretakers. This was also printed in the discharge instructions. Patient was discharged with the following medications and follow up:      Medication List        New Prescriptions      omeprazole 40 MG capsule  Commonly known as: priLOSEC  Take 1 capsule by mouth Daily.     sucralfate 1 g tablet  Commonly known as: CARAFATE  Take 1 tablet by mouth 4 (Four) Times a Day.               Where to Get Your Medications        These medications were sent  to [facility]/pharmacy #[id number] - [place], [place] - [address] - [phone]  - [phone] FX  [address] [place] [address]      Hours: 24-hours Phone: [phone]   omeprazole 40 MG capsule  sucralfate 1 g tablet      No follow-up provider specified.     Final diagnoses:   Duodenitis        ED Disposition       ED Disposition   Discharge    Condition   Stable    Comment   --               This medical record created using voice recognition software.             Luis Noble PA  08/16/24 2255       Luis Noble PA  08/16/24 2256

## 2024-08-16 NOTE — ED TRIAGE NOTES
Pt to ed from home with c/o abd pain and epigastric pain. Pain started two weeks ago once and went away. Pt now having pain x 1 week. Fever at home 101 on Tuesday. Vomiting and diarrhea.

## 2024-08-17 NOTE — DISCHARGE INSTRUCTIONS
Your abdominal imaging shows that you have duodenitis and/or peptic ulcer disease.  You are being discharged home with omeprazole and Carafate.    As discussed, there is also an incidental finding of a large disc herniation at L4-L5 and L5-S1.  You will need to follow-up with your PCP in regards to further imaging especially if you are having low back pain.    Follow-up with your PCP in 3 to 5 days.    Return to the Emergency Department if you develop any uncontrollable fever, intractable pain, nausea, vomiting.

## 2024-09-03 ENCOUNTER — OFFICE VISIT (OUTPATIENT)
Dept: INTERNAL MEDICINE | Facility: CLINIC | Age: 24
End: 2024-09-03
Payer: COMMERCIAL

## 2024-09-03 ENCOUNTER — LAB (OUTPATIENT)
Dept: LAB | Facility: HOSPITAL | Age: 24
End: 2024-09-03
Payer: COMMERCIAL

## 2024-09-03 VITALS
WEIGHT: 210.6 LBS | HEART RATE: 75 BPM | BODY MASS INDEX: 29.48 KG/M2 | HEIGHT: 71 IN | DIASTOLIC BLOOD PRESSURE: 78 MMHG | SYSTOLIC BLOOD PRESSURE: 125 MMHG | TEMPERATURE: 98 F | OXYGEN SATURATION: 96 %

## 2024-09-03 DIAGNOSIS — R10.13 EPIGASTRIC PAIN: Primary | ICD-10-CM

## 2024-09-03 DIAGNOSIS — L98.9 SKIN LESION: ICD-10-CM

## 2024-09-03 DIAGNOSIS — M51.26 LUMBAR DISC HERNIATION: ICD-10-CM

## 2024-09-03 DIAGNOSIS — R10.13 EPIGASTRIC PAIN: ICD-10-CM

## 2024-09-03 LAB — H. PYLORI ANTIGEN STOOL: NEGATIVE

## 2024-09-03 PROCEDURE — 87338 HPYLORI STOOL AG IA: CPT

## 2024-09-03 PROCEDURE — 99214 OFFICE O/P EST MOD 30 MIN: CPT | Performed by: INTERNAL MEDICINE

## 2024-09-03 RX ORDER — OMEPRAZOLE 40 MG/1
40 CAPSULE, DELAYED RELEASE ORAL DAILY
Qty: 14 CAPSULE | Refills: 0 | Status: SHIPPED | OUTPATIENT
Start: 2024-09-03

## 2024-09-03 RX ORDER — FAMOTIDINE 40 MG/1
40 TABLET, FILM COATED ORAL 2 TIMES DAILY
Qty: 180 TABLET | Refills: 0 | Status: SHIPPED | OUTPATIENT
Start: 2024-09-03

## 2024-09-03 NOTE — PROGRESS NOTES
"Chief Complaint  Abdominal Pain (Follow up from ER visit 8/16/2024)    Subjective          Mat Otto presents to [facility]  History of Present Illness  Patient reports having intermittent epigastric pain over last 1-2 months. Patient denies stool changes. Patient denies hematochezia and melena.  Patient does report associated nausea and 1 episode of nonbloody, nonbilious vomiting.  Patient has presented to the ER previously.  At that time, lab work including CBC, CMP, lipase, urinalysis were overall reassuring.  He had abdominal CT scan that revealed duodenitis.  Patient did not take medication as prescribed from the ER.  He reported difficulty getting to the pharmacy.  Incidentally, patient noted to have herniated lumbar disc on abdominal CT.  Patient has not previously had a evaluated by neurosurgery or had physical therapy to help with his back pain.  Patient is amenable however.  Skin lesion -patient reports skin lesion on his back that is seemingly growing over the last 6 months.  Patient also reports lesion has developed sensation including itching.  Patient denies bleeding and erythema.    Current Outpatient Medications   Medication Instructions    famotidine (PEPCID) 40 mg, Oral, 2 Times Daily    omeprazole (PRILOSEC) 40 mg, Oral, Daily    sucralfate (CARAFATE) 1 g, Oral, 4 Times Daily       The following portions of the patient's history were reviewed and updated as appropriate: allergies, current medications, past family history, past medical history, past social history, past surgical history, and problem list.    Objective   Vital Signs:   /78 (BP Location: Right arm)   Pulse 75   Temp 98 °F (36.7 °C) (Temporal)   Ht 180.3 cm (71\")   Wt 95.5 kg (210 lb 9.6 oz)   SpO2 96%   BMI 29.37 kg/m²     Wt Readings from Last 3 Encounters:   09/03/24 95.5 kg (210 lb 9.6 oz)   08/16/24 88.5 kg (195 lb 1.7 oz)   08/05/24 85 kg (187 lb 6.3 oz) "     BP Readings from Last 3 Encounters:   09/03/24 125/78   08/16/24 129/67   08/05/24 105/89     Physical Exam   Appearance: No acute distress, well-nourished  Head: normocephalic, atraumatic  Eyes: extraocular movements intact, no scleral icterus, no conjunctival injection  Ears, Nose, and Throat: external ears normal, nares patent, moist mucous membranes, tympanic membranes clear bilaterally. Tonsils within normal limits. Oropharynx clear.   Cardiovascular: regular rate. no edema  Respiratory: breathing comfortably, symmetric chest rise  Neuro: alert and moves all extremities equally  Lymph: no occipital, cervical, submandibular,or supraclavicular lymphadenopathy.  Skin: small (<1cm), papular, circular lesion overlying thoracic back midline with superficial excoriation. No induration or erythema      Result Review :   The following data was reviewed by: Randolph Bass Jr, MD on 09/03/2024:  Common labs          8/16/2024    18:29   Common Labs   Glucose 95    BUN 13    Creatinine 0.86    Sodium 139    Potassium 4.0    Chloride 103    Calcium 9.4    Albumin 4.2    Total Bilirubin <0.2    Alkaline Phosphatase 70    AST (SGOT) 13    ALT (SGPT) 13    WBC 9.40    Hemoglobin 15.4    Hematocrit 45.6    Platelets 297             Lab Results   Component Value Date    COVID19 Not Detected 08/16/2024    RSV Not Detected 08/16/2024    BILIRUBINUR Negative 08/16/2024          Assessment and Plan    Diagnoses and all orders for this visit:    1. Epigastric pain (Primary)  Comments:  Will start dual reflux medicine.  Test for H. pylori.  Patient given supplies for stool sample while in clinic.  Referred to GI to consider EGD.  Orders:  -     Ambulatory Referral to Gastroenterology  -     omeprazole (priLOSEC) 40 MG capsule; Take 1 capsule by mouth Daily.  Dispense: 14 capsule; Refill: 0  -     famotidine (PEPCID) 40 MG tablet; Take 1 tablet by mouth 2 (Two) Times a Day.  Dispense: 180 tablet; Refill: 0  -     H. Pylori  Antigen, Stool - Stool, Per Rectum; Future    2. Lumbar disc herniation  Comments:  Referred to PT.  Also referred to neurosurgery for further evaluation.  Orders:  -     Ambulatory Referral to Physical Therapy for Evaluation & Treatment  -     Ambulatory Referral to Neurosurgery    3. Skin lesion  Comments:  Refer to dermatology for further evaluation.  Orders:  -     Ambulatory Referral to Dermatology          Medications Discontinued During This Encounter   Medication Reason    DULoxetine (Cymbalta) 30 MG capsule *Therapy completed    tiZANidine (ZANAFLEX) 2 MG tablet *Therapy completed    triamcinolone (KENALOG) 0.1 % cream *Therapy completed    acetaminophen (TYLENOL) 500 MG tablet *Therapy completed    chlorhexidine (PERIDEX) 0.12 % solution *Therapy completed    naproxen (Naprosyn) 500 MG tablet *Therapy completed    omeprazole (priLOSEC) 40 MG capsule Reorder          Follow Up   Return if symptoms worsen or fail to improve.  Patient was given instructions and counseling regarding his condition or for health maintenance advice. Please see specific information pulled into the AVS if appropriate.       Randolph Bass Jr, MD  09/03/24  09:51 EDT

## 2024-09-25 ENCOUNTER — TELEPHONE (OUTPATIENT)
Dept: GASTROENTEROLOGY | Facility: CLINIC | Age: 24
End: 2024-09-25
Payer: COMMERCIAL

## 2024-09-26 ENCOUNTER — OFFICE VISIT (OUTPATIENT)
Dept: NEUROSURGERY | Facility: CLINIC | Age: 24
End: 2024-09-26
Payer: COMMERCIAL

## 2024-09-26 VITALS
WEIGHT: 213.8 LBS | SYSTOLIC BLOOD PRESSURE: 123 MMHG | BODY MASS INDEX: 29.93 KG/M2 | HEART RATE: 82 BPM | HEIGHT: 71 IN | DIASTOLIC BLOOD PRESSURE: 86 MMHG

## 2024-09-26 DIAGNOSIS — G89.29 CHRONIC MIDLINE LOW BACK PAIN WITH LEFT-SIDED SCIATICA: ICD-10-CM

## 2024-09-26 DIAGNOSIS — M51.27 HERNIATED NUCLEUS PULPOSUS, L5-S1: ICD-10-CM

## 2024-09-26 DIAGNOSIS — M51.26 HERNIATED NUCLEUS PULPOSUS, L4-5: Primary | ICD-10-CM

## 2024-09-26 DIAGNOSIS — M54.42 CHRONIC MIDLINE LOW BACK PAIN WITH LEFT-SIDED SCIATICA: ICD-10-CM

## 2024-09-27 ENCOUNTER — PATIENT ROUNDING (BHMG ONLY) (OUTPATIENT)
Dept: NEUROSURGERY | Facility: CLINIC | Age: 24
End: 2024-09-27
Payer: COMMERCIAL

## 2024-10-05 ENCOUNTER — HOSPITAL ENCOUNTER (EMERGENCY)
Facility: HOSPITAL | Age: 24
Discharge: HOME | End: 2024-10-05
Attending: EMERGENCY MEDICINE
Payer: COMMERCIAL

## 2024-10-05 VITALS
OXYGEN SATURATION: 100 % | DIASTOLIC BLOOD PRESSURE: 75 MMHG | HEIGHT: 72 IN | HEART RATE: 80 BPM | BODY MASS INDEX: 28.44 KG/M2 | RESPIRATION RATE: 18 BRPM | SYSTOLIC BLOOD PRESSURE: 121 MMHG | TEMPERATURE: 97.9 F | WEIGHT: 210 LBS

## 2024-10-05 DIAGNOSIS — L03.221 CELLULITIS OF NECK: Primary | ICD-10-CM

## 2024-10-05 PROCEDURE — 99282 EMERGENCY DEPT VISIT SF MDM: CPT

## 2024-10-05 RX ORDER — NAPROXEN 250 MG/1
800 TABLET ORAL 2 TIMES DAILY PRN
COMMUNITY

## 2024-10-05 RX ORDER — SULFAMETHOXAZOLE/TRIMETHOPRIM 800-160 MG
1 TABLET ORAL 2 TIMES DAILY
Qty: 14 TABLET | Refills: 0 | Status: SHIPPED | OUTPATIENT
Start: 2024-10-05 | End: 2024-10-12

## 2024-10-05 NOTE — ED PROVIDER NOTES
Time: 12:24 PM EDT  Date of encounter:  10/5/2024  Independent Historian/Clinical History and Information was obtained by:   Patient    History is limited by: N/A    Chief Complaint: skin infection       History of Present Illness:  Patient is a 24 y.o. year old male who presents to the emergency department for evaluation of skin infection to left anterior neck that began yesterday.  He states it started as a blackhead and he began squeezing it last night and today it is more red and swollen.  Patient states he has a history of MRSA.  Patient also reports chronic low back pain but no acute injury or trauma.  Patient states he sees a back doctor for this.  Patient denies urinary/bowel incontinence.  Patient denies radiculopathy at this time.  Patient denies fever.      Patient Care Team  Primary Care Provider: Demarcus Montoya MD    Past Medical History:     Allergies   Allergen Reactions    Dilaudid [Hydromorphone] Unknown - High Severity     Syncope      Past Medical History:   Diagnosis Date    Depression      Past Surgical History:   Procedure Laterality Date    ANKLE OPEN REDUCTION INTERNAL FIXATION Right     INCISION AND DRAINAGE ABSCESS Right 7/12/2023    Procedure: INCISION AND DRAINAGE ABSCESS right axilla;  Surgeon: Edgardo Forbes MD;  Location: [facility] MAIN OR;  Service: General;  Laterality: Right;    KNEE ACL RECONSTRUCTION Right     KNEE ARTHROSCOPY W/ MENISCAL REPAIR Right      Family History   Problem Relation Age of Onset    Hypertension Father     Diabetes Maternal Grandfather     Hypertension Maternal Grandfather     Diabetes Cousin        Home Medications:  Prior to Admission medications    Medication Sig Start Date End Date Taking? Authorizing Provider   naproxen (NAPROSYN) 250 MG tablet Take 800 mg by mouth 2 (Two) Times a Day As Needed for Mild Pain.   Yes Provider, MD Randa   famotidine (PEPCID) 40 MG tablet Take 1 tablet by mouth 2 (Two) Times a Day. 9/3/24   Randolph Bass  "Simba Muñoz MD   omeprazole (priLOSEC) 40 MG capsule Take 1 capsule by mouth Daily. 9/3/24   Randolph Bass Jr., MD   sucralfate (CARAFATE) 1 g tablet Take 1 tablet by mouth 4 (Four) Times a Day.  Patient not taking: Reported on 9/26/2024 8/16/24   Luis Noble PA        Social History:   Social History     Tobacco Use    Smoking status: Every Day     Current packs/day: 1.00     Average packs/day: 1 pack/day for 6.4 years (6.4 ttl pk-yrs)     Types: Cigarettes     Start date: 5/10/2018     Passive exposure: Current    Smokeless tobacco: Never   Vaping Use    Vaping status: Never Used   Substance Use Topics    Alcohol use: Not Currently     Comment: occ    Drug use: Never         Review of Systems:  Review of Systems   Constitutional:  Negative for chills and fever.   HENT:  Negative for congestion, rhinorrhea and sore throat.    Eyes:  Negative for pain and visual disturbance.   Respiratory:  Negative for apnea, cough, chest tightness and shortness of breath.    Cardiovascular:  Negative for chest pain and palpitations.   Gastrointestinal:  Negative for abdominal pain, diarrhea, nausea and vomiting.   Genitourinary:  Negative for difficulty urinating and dysuria.   Musculoskeletal:  Positive for back pain. Negative for joint swelling and myalgias.   Skin:  Positive for color change.   Neurological:  Negative for seizures and headaches.   Psychiatric/Behavioral: Negative.     All other systems reviewed and are negative.       Physical Exam:  /75 (BP Location: Right arm, Patient Position: Sitting)   Pulse 80   Temp 97.9 °F (36.6 °C) (Oral)   Resp 18   Ht 182.9 cm (72\")   Wt 95.3 kg (210 lb)   SpO2 100%   BMI 28.48 kg/m²     Physical Exam  Vitals and nursing note reviewed.   Constitutional:       General: He is not in acute distress.     Appearance: Normal appearance. He is not toxic-appearing.   HENT:      Head: Normocephalic and atraumatic.      Jaw: There is normal jaw occlusion.   Eyes:      " General: Lids are normal.      Extraocular Movements: Extraocular movements intact.      Conjunctiva/sclera: Conjunctivae normal.      Pupils: Pupils are equal, round, and reactive to light.   Cardiovascular:      Rate and Rhythm: Normal rate and regular rhythm.      Pulses: Normal pulses.      Heart sounds: Normal heart sounds.   Pulmonary:      Effort: Pulmonary effort is normal. No respiratory distress.      Breath sounds: Normal breath sounds. No wheezing or rhonchi.   Abdominal:      General: Abdomen is flat.      Palpations: Abdomen is soft.      Tenderness: There is no abdominal tenderness. There is no guarding or rebound.   Musculoskeletal:         General: No tenderness (No lumbar paraspinal tenderness appreciated upon palpation.). Normal range of motion.      Cervical back: Normal range of motion and neck supple.      Right lower leg: No edema.      Left lower leg: No edema.   Skin:     General: Skin is warm and dry.      Comments: Cellulitis present to left anterior neck.  No induration/fluctuance present.   Neurological:      Mental Status: He is alert and oriented to person, place, and time. Mental status is at baseline.   Psychiatric:         Mood and Affect: Mood normal.                  Procedures:  Procedures      Medical Decision Making:      Comorbidities that affect care:    None    External Notes reviewed:          The following orders were placed and all results were independently analyzed by me:  No orders of the defined types were placed in this encounter.      Medications Given in the Emergency Department:  Medications - No data to display     ED Course:         Labs:    Lab Results (last 24 hours)       ** No results found for the last 24 hours. **             Imaging:    No Radiology Exams Resulted Within Past 24 Hours      Differential Diagnosis and Discussion:    Back Pain: The patient presents with back pain. My differential diagnosis includes but is not limited to acute spinal epidural  abscess, acute spinal epidural bleed, cauda equina syndrome, abdominal aortic aneurysm, aortic dissection, kidney stone, pyelonephritis, musculoskeletal back pain, spinal fracture, and osteoarthritis.   Rash: Differential diagnosis includes but is not limited to sepsis, cellulitis, [place] Spotted Fever, meningitis, meningococcemia, Varicella, Strep infection, dermatitis, allergic reaction, Lyme disease, and toxic shock syndrome.        MDM       There is no obvious induration/fluctuance present to left anterior neck.  However there is evidence of cellulitis.  Given patient's history of MRSA I will treat with Bactrim.  Patient is afebrile.  Patient has chronic back pain and denies any new injury/trauma.  Patient denies urinary/bowel incontinence as well as radiculopathy.  Therefore I will not perform new imaging.  I instructed patient to return to ED if he develops any new or worsening symptoms.  Otherwise follow-up PCP.  Patient states he understands and agrees with plan of care.              Patient Care Considerations:          Consultants/Shared Management Plan:    None    Social Determinants of Health:    Patient is independent, reliable, and has access to care.       Disposition and Care Coordination:    Discharged: The patient is suitable and stable for discharge with no need for consideration of admission.    I have explained the patient´s condition, diagnoses and treatment plan based on the information available to me at this time. I have answered questions and addressed any concerns. The patient has a good  understanding of the patient´s diagnosis, condition, and treatment plan as can be expected at this point. The vital signs have been stable. The patient´s condition is stable and appropriate for discharge from the emergency department.      The patient will pursue further outpatient evaluation with the primary care physician or other designated or consulting physician as outlined in the discharge  instructions. They are agreeable to this plan of care and follow-up instructions have been explained in detail. The patient has received these instructions in written format and has expressed an understanding of the discharge instructions. The patient is aware that any significant change in condition or worsening of symptoms should prompt an immediate return to this or the closest emergency department or call to 911.  I have explained discharge medications and the need for follow up with the patient/caretakers. This was also printed in the discharge instructions. Patient was discharged with the following medications and follow up:      Medication List        New Prescriptions      sulfamethoxazole-trimethoprim 800-160 MG per tablet  Commonly known as: BACTRIM DS,SEPTRA DS  Take 1 tablet by mouth 2 (Two) Times a Day for 7 days.               Where to Get Your Medications        These medications were sent to [id number] - [place], [place] [address] AT [facility] [address] /[facility] & [place] - [phone]  - [phone]   [address] [place] [address]      Phone: [phone]   sulfamethoxazole-trimethoprim 800-160 MG per tablet      Demarcus Montoya MD  [address]  [address]  [facility] [address] [phone]    Call in 1 day  To schedule follow-up       Final diagnoses:   Cellulitis of neck        ED Disposition       ED Disposition   Discharge    Condition   Stable    Comment   --               This medical record created using voice recognition software.             Cleve Barnhart PA-C  10/05/24 [id number]

## 2024-10-23 NOTE — PROGRESS NOTES
"Chief Complaint  Follow-up (ED follow up. Patient stated he removed the spot on his neck with fingernail clippers after he left the ER. )    Subjective          Mat Otto presents to [facility]  History of Present Illness    Here with naida.  Mr. Otto is the primary historian.    Seen 10/5/24 and diagnosed with a cellulitis associated with a black papule on left neck. Rx bactrim. He reports that two days later (10/7) removed the black papule with fingernail clipper.  He reports there was some pus released from his procedure.  He completed the bactrim and the redness/purulence resolved.    He will be seeing derm ([facility]) in December.    He has an MRI scheduled next month by [facility].  He has not yet heard from [facility].  I did encourage him to call and schedule with them.      Current Outpatient Medications   Medication Instructions    famotidine (PEPCID) 40 mg, Oral, 2 Times Daily    naproxen (NAPROSYN) 800 mg, 2 Times Daily PRN    omeprazole (PRILOSEC) 40 mg, Oral, Daily    sucralfate (CARAFATE) 1 g, Oral, 4 Times Daily       The following portions of the patient's history were reviewed and updated as appropriate: allergies, current medications, past family history, past medical history, past social history, past surgical history, and problem list.    Objective   Vital Signs:   /88   Pulse 77   Temp 98.7 °F (37.1 °C) (Temporal)   Ht 182.9 cm (72\")   Wt 98 kg (216 lb)   SpO2 97%   BMI 29.29 kg/m²     BP Readings from Last 3 Encounters:   10/24/24 128/88   10/05/24 121/75   09/26/24 123/86     Wt Readings from Last 3 Encounters:   10/24/24 98 kg (216 lb)   10/05/24 95.3 kg (210 lb)   09/26/24 97 kg (213 lb 12.8 oz)           Physical Exam  Vitals reviewed.   Constitutional:       General: He is not in acute distress.     Appearance: Normal appearance. He is not ill-appearing, toxic-appearing or diaphoretic.   HENT: "      Head: Normocephalic and atraumatic.      Right Ear: External ear normal.      Left Ear: External ear normal.   Eyes:      Conjunctiva/sclera: Conjunctivae normal.   Cardiovascular:      Rate and Rhythm: Normal rate and regular rhythm.      Pulses: Normal pulses.      Heart sounds: Normal heart sounds. No murmur heard.     No friction rub. No gallop.   Pulmonary:      Effort: Pulmonary effort is normal. No respiratory distress.      Breath sounds: Normal breath sounds. No stridor. No wheezing, rhonchi or rales.   Chest:      Chest wall: No tenderness.   Abdominal:      General: Abdomen is flat.      Palpations: Abdomen is soft. There is no mass.      Tenderness: There is no abdominal tenderness.   Musculoskeletal:      Right lower leg: No edema.      Left lower leg: No edema.   Skin:     General: Skin is warm and dry.      Comments: Left neck examined.  No erythema, no induration.  A superficial erosion is noted where papule was reportedly located   Neurological:      General: No focal deficit present.      Mental Status: He is alert. Mental status is at baseline.   Psychiatric:         Behavior: Behavior normal.         Thought Content: Thought content normal.         Judgment: Judgment normal.          Result Review :   The following data was reviewed by: Demarcus Montoya MD on 10/24/2024:  Common labs          8/16/2024    18:29   Common Labs   Glucose 95    BUN 13    Creatinine 0.86    Sodium 139    Potassium 4.0    Chloride 103    Calcium 9.4    Albumin 4.2    Total Bilirubin <0.2    Alkaline Phosphatase 70    AST (SGOT) 13    ALT (SGPT) 13    WBC 9.40    Hemoglobin 15.4    Hematocrit 45.6    Platelets 297             Lab Results   Component Value Date    COVID19 Not Detected 08/16/2024    RSV Not Detected 08/16/2024    BILIRUBINUR Negative 08/16/2024            Assessment and Plan    Diagnoses and all orders for this visit:    1. Cellulitis of neck (Primary)    2. Skin lesion    3. Chronic bilateral low  back pain with left-sided sciatica      Neck cellulitis, neck mass:  -cellulitis resolved  -neck mass is s/p removal by the patient  -seeing derm in near term    LBP:  -MRI in near term  -pain management referral placed by [facility].   I did encourage him to call to schedule with them      There are no discontinued medications.       Follow Up   Return in about 3 months (around 1/24/2025), or if symptoms worsen or fail to improve.  Patient was given instructions and counseling regarding his condition or for health maintenance advice. Please see specific information pulled into the AVS if appropriate.       Demarcus Montoya MD  10/24/24  11:42 EDT

## 2024-10-24 ENCOUNTER — OFFICE VISIT (OUTPATIENT)
Dept: INTERNAL MEDICINE | Facility: CLINIC | Age: 24
End: 2024-10-24
Payer: COMMERCIAL

## 2024-10-24 VITALS
BODY MASS INDEX: 29.26 KG/M2 | WEIGHT: 216 LBS | TEMPERATURE: 98.7 F | OXYGEN SATURATION: 97 % | HEIGHT: 72 IN | HEART RATE: 77 BPM | SYSTOLIC BLOOD PRESSURE: 128 MMHG | DIASTOLIC BLOOD PRESSURE: 88 MMHG

## 2024-10-24 DIAGNOSIS — M54.42 CHRONIC BILATERAL LOW BACK PAIN WITH LEFT-SIDED SCIATICA: ICD-10-CM

## 2024-10-24 DIAGNOSIS — L03.221 CELLULITIS OF NECK: Primary | ICD-10-CM

## 2024-10-24 DIAGNOSIS — G89.29 CHRONIC BILATERAL LOW BACK PAIN WITH LEFT-SIDED SCIATICA: ICD-10-CM

## 2024-10-24 DIAGNOSIS — L98.9 SKIN LESION: ICD-10-CM

## 2024-10-24 PROCEDURE — 99214 OFFICE O/P EST MOD 30 MIN: CPT | Performed by: STUDENT IN AN ORGANIZED HEALTH CARE EDUCATION/TRAINING PROGRAM

## 2024-11-05 ENCOUNTER — TELEPHONE (OUTPATIENT)
Dept: NEUROLOGY | Facility: CLINIC | Age: 24
End: 2024-11-05

## 2024-11-05 NOTE — TELEPHONE ENCOUNTER
Cheryl with [facility] called states patient's MRI that's scheduled for 11/6 is pending auth, wants to know if it's okay to r/s, please advise [phone]

## 2024-11-30 DIAGNOSIS — R10.13 EPIGASTRIC PAIN: ICD-10-CM

## 2024-12-02 RX ORDER — FAMOTIDINE 40 MG/1
40 TABLET, FILM COATED ORAL 2 TIMES DAILY
Qty: 180 TABLET | Refills: 0 | Status: SHIPPED | OUTPATIENT
Start: 2024-12-02

## 2025-01-11 ENCOUNTER — APPOINTMENT (OUTPATIENT)
Dept: GENERAL RADIOLOGY | Facility: HOSPITAL | Age: 25
End: 2025-01-11
Payer: MEDICAID

## 2025-01-11 ENCOUNTER — HOSPITAL ENCOUNTER (EMERGENCY)
Facility: HOSPITAL | Age: 25
Discharge: HOME | End: 2025-01-11
Attending: EMERGENCY MEDICINE
Payer: MEDICAID

## 2025-01-11 VITALS
HEIGHT: 71 IN | TEMPERATURE: 98.2 F | DIASTOLIC BLOOD PRESSURE: 81 MMHG | RESPIRATION RATE: 18 BRPM | BODY MASS INDEX: 30.1 KG/M2 | OXYGEN SATURATION: 98 % | HEART RATE: 73 BPM | SYSTOLIC BLOOD PRESSURE: 139 MMHG | WEIGHT: 215 LBS

## 2025-01-11 DIAGNOSIS — M51.369 DEGENERATION OF INTERVERTEBRAL DISC OF LUMBAR REGION, UNSPECIFIED WHETHER PAIN PRESENT: Primary | ICD-10-CM

## 2025-01-11 PROCEDURE — 63710000001 ONDANSETRON ODT 4 MG TABLET DISPERSIBLE: Performed by: EMERGENCY MEDICINE

## 2025-01-11 PROCEDURE — 25010000002 MORPHINE PER 10 MG: Performed by: EMERGENCY MEDICINE

## 2025-01-11 PROCEDURE — 72100 X-RAY EXAM L-S SPINE 2/3 VWS: CPT

## 2025-01-11 PROCEDURE — 96372 THER/PROPH/DIAG INJ SC/IM: CPT

## 2025-01-11 PROCEDURE — 25010000002 KETOROLAC TROMETHAMINE PER 15 MG: Performed by: EMERGENCY MEDICINE

## 2025-01-11 PROCEDURE — 99283 EMERGENCY DEPT VISIT LOW MDM: CPT

## 2025-01-11 RX ORDER — KETOROLAC TROMETHAMINE 30 MG/ML
30 INJECTION, SOLUTION INTRAMUSCULAR; INTRAVENOUS ONCE
Status: COMPLETED | OUTPATIENT
Start: 2025-01-11 | End: 2025-01-11

## 2025-01-11 RX ORDER — ONDANSETRON 4 MG/1
4 TABLET, ORALLY DISINTEGRATING ORAL ONCE
Status: COMPLETED | OUTPATIENT
Start: 2025-01-11 | End: 2025-01-11

## 2025-01-11 RX ADMIN — ONDANSETRON 4 MG: 4 TABLET, ORALLY DISINTEGRATING ORAL at 19:04

## 2025-01-11 RX ADMIN — MORPHINE SULFATE 4 MG: 4 INJECTION, SOLUTION INTRAMUSCULAR; INTRAVENOUS at 19:04

## 2025-01-11 RX ADMIN — KETOROLAC TROMETHAMINE 30 MG: 30 INJECTION, SOLUTION INTRAMUSCULAR; INTRAVENOUS at 19:04

## 2025-01-12 NOTE — ED PROVIDER NOTES
Time: 7:01 PM EST  Date of encounter:  1/11/2025  Independent Historian/Clinical History and Information was obtained by:   Patient    History is limited by: N/A    Chief Complaint: Low back pain      History of Present Illness:  Patient is a 24 y.o. year old male who presents to the emergency department for evaluation of low back pain that began 4 days ago when trying to help his family push a vehicle out of a driveway.  Patient states he has a history of bulging disc.  Patient states the pain radiates down his left leg at times.  Patient denies urinary/bowel incontinence.  Patient states he is able to ambulate.      Patient Care Team  Primary Care Provider: Demarcus Montoya MD    Past Medical History:     Allergies   Allergen Reactions    Dilaudid [Hydromorphone] Unknown - High Severity     Syncope      Past Medical History:   Diagnosis Date    Depression      Past Surgical History:   Procedure Laterality Date    ANKLE OPEN REDUCTION INTERNAL FIXATION Right     INCISION AND DRAINAGE ABSCESS Right 7/12/2023    Procedure: INCISION AND DRAINAGE ABSCESS right axilla;  Surgeon: Edgardo Forbes MD;  Location: [facility];  Service: General;  Laterality: Right;    KNEE ACL RECONSTRUCTION Right     KNEE ARTHROSCOPY W/ MENISCAL REPAIR Right      Family History   Problem Relation Age of Onset    Hypertension Father     Diabetes Maternal Grandfather     Hypertension Maternal Grandfather     Diabetes Cousin        Home Medications:  Prior to Admission medications    Medication Sig Start Date End Date Taking? Authorizing Provider   famotidine (PEPCID) 40 MG tablet TAKE 1 TABLET BY MOUTH TWICE DAILY 12/2/24   Randolph Bass Jr., MD   naproxen (NAPROSYN) 250 MG tablet Take 800 mg by mouth 2 (Two) Times a Day As Needed for Mild Pain.  Patient not taking: Reported on 10/24/2024    ProviderRanda MD   omeprazole (priLOSEC) 40 MG capsule Take 1 capsule by mouth Daily.  Patient not taking: Reported on  "10/24/2024 9/3/24   Randolph Bass Jr., MD   sucralfate (CARAFATE) 1 g tablet Take 1 tablet by mouth 4 (Four) Times a Day.  Patient not taking: Reported on 10/24/2024 8/16/24   Luis Noble PA        Social History:   Social History     Tobacco Use    Smoking status: Every Day     Current packs/day: 1.00     Average packs/day: 1 pack/day for 6.7 years (6.7 ttl pk-yrs)     Types: Cigarettes     Start date: 5/10/2018     Passive exposure: Current    Smokeless tobacco: Never   Vaping Use    Vaping status: Never Used   Substance Use Topics    Alcohol use: Not Currently     Comment: occ    Drug use: Never         Review of Systems:  Review of Systems   Constitutional:  Negative for chills and fever.   HENT:  Negative for congestion, rhinorrhea and sore throat.    Eyes:  Negative for pain and visual disturbance.   Respiratory:  Negative for apnea, cough, chest tightness and shortness of breath.    Cardiovascular:  Negative for chest pain and palpitations.   Gastrointestinal:  Negative for abdominal pain, diarrhea, nausea and vomiting.   Genitourinary:  Negative for difficulty urinating and dysuria.   Musculoskeletal:  Positive for back pain. Negative for joint swelling and myalgias.   Skin:  Negative for color change.   Neurological:  Negative for seizures and headaches.   Psychiatric/Behavioral: Negative.     All other systems reviewed and are negative.       Physical Exam:  /81 (BP Location: Right arm, Patient Position: Sitting)   Pulse 73   Temp 98.2 °F (36.8 °C)   Resp 18   Ht 180.3 cm (71\")   Wt 97.5 kg (215 lb)   SpO2 98%   BMI 29.99 kg/m²     Physical Exam  Vitals and nursing note reviewed.   Constitutional:       General: He is not in acute distress.     Appearance: Normal appearance. He is not toxic-appearing.   HENT:      Head: Normocephalic and atraumatic.      Jaw: There is normal jaw occlusion.   Eyes:      General: Lids are normal.      Extraocular Movements: Extraocular movements " intact.      Conjunctiva/sclera: Conjunctivae normal.      Pupils: Pupils are equal, round, and reactive to light.   Cardiovascular:      Rate and Rhythm: Normal rate and regular rhythm.      Pulses: Normal pulses.      Heart sounds: Normal heart sounds.   Pulmonary:      Effort: Pulmonary effort is normal. No respiratory distress.      Breath sounds: Normal breath sounds. No wheezing or rhonchi.   Abdominal:      General: Abdomen is flat.      Palpations: Abdomen is soft.      Tenderness: There is no abdominal tenderness. There is no guarding or rebound.   Musculoskeletal:         General: Tenderness (Mild lumbar paraspinal tenderness appreciated upon palpation.) present. Normal range of motion.      Cervical back: Normal range of motion and neck supple.      Right lower leg: No edema.      Left lower leg: No edema.   Skin:     General: Skin is warm and dry.   Neurological:      Mental Status: He is alert and oriented to person, place, and time. Mental status is at baseline.   Psychiatric:         Mood and Affect: Mood normal.                    Medical Decision Making:      Comorbidities that affect care:    None    External Notes reviewed:          The following orders were placed and all results were independently analyzed by me:  Orders Placed This Encounter   Procedures    XR Spine Lumbar 2 or 3 View    Ambulatory Referral to Neurosurgery       Medications Given in the Emergency Department:  Medications   morphine injection 4 mg (4 mg Intramuscular Given 1/11/25 1904)   ondansetron ODT (ZOFRAN-ODT) disintegrating tablet 4 mg (4 mg Oral Given 1/11/25 1904)   ketorolac (TORADOL) injection 30 mg (30 mg Intramuscular Given 1/11/25 1904)        ED Course:         Labs:    Lab Results (last 24 hours)       ** No results found for the last 24 hours. **             Imaging:    XR Spine Lumbar 2 or 3 View    Result Date: 1/11/2025  XR SPINE LUMBAR 2 OR 3 VW Date of exam: 1/11/2025, 6:58 P.M., EST. Indications: Low back  pain after pushing heavy objects (today). Comparisons: 8/16/2024; 2/27/2022; 3/28/2018. FINDINGS: Three (3) non-standing views of the lumbar spine were obtained. No acute fracture or acute malalignment is identified. Five (5) lumbar-like vertebrae are present. Disc degeneration is seen at several levels, especially at L4-5 and L5-S1. If symptoms or clinical concerns persist, consider imaging follow-up.     No acute fracture or acute malalignment is identified. Please see above comments for further detail.    Portions of this note were completed with a voice recognition program. Electronically Signed: Jonel Jones MD  1/11/2025 7:11 PM EST  Workstation ID: [id number]       Differential Diagnosis and Discussion:    Back Pain: The patient presents with back pain. My differential diagnosis includes but is not limited to acute spinal epidural abscess, acute spinal epidural bleed, cauda equina syndrome, abdominal aortic aneurysm, aortic dissection, kidney stone, pyelonephritis, musculoskeletal back pain, spinal fracture, and osteoarthritis.     PROCEDURES:    X-ray were performed in the emergency department and all X-ray impressions were independently interpreted by me.    No orders to display       Procedures    MDM       X-ray shows degenerative just changes.  Patient is resting comfortably at this time.  Oxygen saturation 98% on room air.  Patient is afebrile.  I will send patient home with muscle relaxers.  I instructed patient to return to ED if he develops any new or worsening symptoms.  Otherwise follow-up with his neurosurgeon and PCP.  Patient states he understands and agrees with plan of care.              Patient Care Considerations:          Consultants/Shared Management Plan:    None    Social Determinants of Health:    Patient is independent, reliable, and has access to care.       Disposition and Care Coordination:    Discharged: The patient is suitable and stable for discharge with no need for  consideration of admission.    I have explained the patient´s condition, diagnoses and treatment plan based on the information available to me at this time. I have answered questions and addressed any concerns. The patient has a good  understanding of the patient´s diagnosis, condition, and treatment plan as can be expected at this point. The vital signs have been stable. The patient´s condition is stable and appropriate for discharge from the emergency department.      The patient will pursue further outpatient evaluation with the primary care physician or other designated or consulting physician as outlined in the discharge instructions. They are agreeable to this plan of care and follow-up instructions have been explained in detail. The patient has received these instructions in written format and has expressed an understanding of the discharge instructions. The patient is aware that any significant change in condition or worsening of symptoms should prompt an immediate return to this or the closest emergency department or call to [facility].  I have explained discharge medications and the need for follow up with the patient/caretakers. This was also printed in the discharge instructions. Patient was discharged with the following medications and follow up:      Medication List      No changes were made to your prescriptions during this visit.      Cam Pruett MD  [address]  [facility] [address]  [place] [address]  [phone]    Call in 1 day  To schedule follow-up    Demarcus Montoya MD  [address] [facility] [address] [facility] [address]  [place] [address]  [phone]    Call in 1 day  To schedule follow-up       Final diagnoses:   Degeneration of intervertebral disc of lumbar region, unspecified whether pain present        ED Disposition       ED Disposition   Discharge    Condition   Stable    Comment   --               This medical record created using voice recognition software.             Cleve Barnhart PA-C  01/11/25 5620

## 2025-01-17 ENCOUNTER — TELEPHONE (OUTPATIENT)
Dept: NEUROSURGERY | Facility: CLINIC | Age: 25
End: 2025-01-17

## 2025-01-17 NOTE — TELEPHONE ENCOUNTER
Left voicemail for patient regarding overdue lumbar spine MRI order, to see if he has completed or plans to complete.

## 2025-03-04 ENCOUNTER — HOSPITAL ENCOUNTER (EMERGENCY)
Facility: HOSPITAL | Age: 25
Discharge: HOME | End: 2025-03-04
Attending: EMERGENCY MEDICINE | Admitting: EMERGENCY MEDICINE
Payer: MEDICAID

## 2025-03-04 VITALS
TEMPERATURE: 98 F | RESPIRATION RATE: 17 BRPM | SYSTOLIC BLOOD PRESSURE: 121 MMHG | OXYGEN SATURATION: 94 % | WEIGHT: 231.26 LBS | BODY MASS INDEX: 32.38 KG/M2 | HEART RATE: 82 BPM | DIASTOLIC BLOOD PRESSURE: 68 MMHG | HEIGHT: 71 IN

## 2025-03-04 DIAGNOSIS — G89.29 ACUTE ON CHRONIC LOW BACK PAIN: Primary | ICD-10-CM

## 2025-03-04 DIAGNOSIS — M54.50 ACUTE ON CHRONIC LOW BACK PAIN: Primary | ICD-10-CM

## 2025-03-04 DIAGNOSIS — R04.0 EPISTAXIS: ICD-10-CM

## 2025-03-04 DIAGNOSIS — M51.369 BULGING OF LUMBAR INTERVERTEBRAL DISC: ICD-10-CM

## 2025-03-04 PROCEDURE — 99283 EMERGENCY DEPT VISIT LOW MDM: CPT

## 2025-03-04 PROCEDURE — 25010000002 KETOROLAC TROMETHAMINE PER 15 MG: Performed by: EMERGENCY MEDICINE

## 2025-03-04 PROCEDURE — 96372 THER/PROPH/DIAG INJ SC/IM: CPT

## 2025-03-04 RX ORDER — HYDROCODONE BITARTRATE AND ACETAMINOPHEN 5; 325 MG/1; MG/1
1 TABLET ORAL EVERY 6 HOURS PRN
Qty: 10 TABLET | Refills: 0 | Status: SHIPPED | OUTPATIENT
Start: 2025-03-04

## 2025-03-04 RX ORDER — KETOROLAC TROMETHAMINE 30 MG/ML
60 INJECTION, SOLUTION INTRAMUSCULAR; INTRAVENOUS ONCE
Status: COMPLETED | OUTPATIENT
Start: 2025-03-04 | End: 2025-03-04

## 2025-03-04 RX ORDER — HYDROCODONE BITARTRATE AND ACETAMINOPHEN 5; 325 MG/1; MG/1
1 TABLET ORAL ONCE
Status: COMPLETED | OUTPATIENT
Start: 2025-03-04 | End: 2025-03-04

## 2025-03-04 RX ADMIN — KETOROLAC TROMETHAMINE 60 MG: 30 INJECTION, SOLUTION INTRAMUSCULAR at 06:40

## 2025-03-04 RX ADMIN — HYDROCODONE BITARTRATE AND ACETAMINOPHEN 1 TABLET: 5; 325 TABLET ORAL at 06:40

## 2025-03-04 NOTE — ED PROVIDER NOTES
Time: 6:38 AM EST  Date of encounter:  3/4/2025  Independent Historian/Clinical History and Information was obtained by:   Patient and Family    History is limited by: N/A    Chief Complaint: Low back pain, nosebleed      History of Present Illness:  Patient is a 24 y.o. year old male with history of chronic low back pain and bulging disks in the lumbar spine who presents to the emergency department for evaluation of acutely worsened low back pain with movement that occurred at work today and he was sent in here by his boss to get evaluated.    He denies any recent injuries or any bowel or bladder incontinence or weakness or fevers.    It sounds like he already had imaging of his lumbar spine showing bulging disks, and he was following up with spine surgery clinic but then lost his health insurance.    He is currently working on getting his health insurance back at his job.    He has been using naproxen for pain but reports history of stomach ulcers.    He also had a nosebleed that lasted several hours tonight that eventually stopped after packing his nose with toilet paper.  He is not on any blood thinners.      Patient Care Team  Primary Care Provider: Demarcus Montoya MD    Past Medical History:     Allergies   Allergen Reactions    Dilaudid [Hydromorphone] Unknown - High Severity     Syncope      Past Medical History:   Diagnosis Date    Depression      Past Surgical History:   Procedure Laterality Date    ANKLE OPEN REDUCTION INTERNAL FIXATION Right     INCISION AND DRAINAGE ABSCESS Right 7/12/2023    Procedure: INCISION AND DRAINAGE ABSCESS right axilla;  Surgeon: Edgardo Forbes MD;  Location: [facility];  Service: General;  Laterality: Right;    KNEE ACL RECONSTRUCTION Right     KNEE ARTHROSCOPY W/ MENISCAL REPAIR Right      Family History   Problem Relation Age of Onset    Hypertension Father     Diabetes Maternal Grandfather     Hypertension Maternal Grandfather     Diabetes Cousin        Home  "Medications:  Prior to Admission medications    Medication Sig Start Date End Date Taking? Authorizing Provider   famotidine (PEPCID) 40 MG tablet TAKE 1 TABLET BY MOUTH TWICE DAILY 12/2/24   Randolph Bass Jr., MD   naproxen (NAPROSYN) 250 MG tablet Take 800 mg by mouth 2 (Two) Times a Day As Needed for Mild Pain.  Patient not taking: Reported on 10/24/2024    ProviderRanda MD   omeprazole (priLOSEC) 40 MG capsule Take 1 capsule by mouth Daily.  Patient not taking: Reported on 10/24/2024 9/3/24   Randolph Bass Jr., MD   sucralfate (CARAFATE) 1 g tablet Take 1 tablet by mouth 4 (Four) Times a Day.  Patient not taking: Reported on 9/26/2024 8/16/24   Luis Noble PA        Social History:   Social History     Tobacco Use    Smoking status: Every Day     Current packs/day: 1.00     Average packs/day: 1 pack/day for 6.8 years (6.8 ttl pk-yrs)     Types: Cigarettes     Start date: 5/10/2018     Passive exposure: Current    Smokeless tobacco: Never   Vaping Use    Vaping status: Never Used   Substance Use Topics    Alcohol use: Not Currently     Comment: occ    Drug use: Never         Review of Systems:  Review of Systems   I performed a 10 point review of systems which was all negative, except for the positives found in the HPI above.  Physical Exam:  /68 (BP Location: Left arm, Patient Position: Lying)   Pulse 82   Temp 98 °F (36.7 °C) (Oral)   Resp 17   Ht 180.3 cm (71\")   Wt 105 kg (231 lb 4.2 oz)   SpO2 94%   BMI 32.25 kg/m²     Physical Exam   General: Awake alert and in moderate distress due to back pain    HEENT: Head normocephalic atraumatic, eyes PERRLA EOMI, nose normal, oropharynx normal.  No current epistaxis or dried blood in the nostril, no bleeding down the posterior pharynx.  Normal exam.    Neck: Supple full range of motion, no meningismus, no lymphadenopathy    Heart: Regular rate and rhythm, no murmurs or rubs, 2+ radial pulses bilaterally    Lungs: Clear to " auscultation bilaterally without wheezes or crackles, no respiratory distress    Abdomen: Soft, nontender, nondistended, no rebound or guarding    Back exam: Mild lumbar spinal tenderness diffusely but no step-off deformities, normal visual inspection without rash or ecchymosis, pain with range of motion, pain on the soft tissue of the right side of the lower back.    Skin: Warm, dry, no rash    Musculoskeletal: Normal range of motion, no lower extremity edema    Neurologic: Oriented x3, no motor deficits no sensory deficits    Psychiatric: Mood appears stable, no psychosis            Medical Decision Making:      Comorbidities that affect care:    Chronic low back pain, bulging lumbar disc    External Notes reviewed:    Previous Radiological Studies: I reviewed his most recent lumbar spine x-rays from 2 months ago in the ED showing degenerative disc disease throughout the lumbar spine including L4-L5 and L5-S1.      The following orders were placed and all results were independently analyzed by me:  No orders of the defined types were placed in this encounter.      Medications Given in the Emergency Department:  Medications   ketorolac (TORADOL) injection 60 mg (60 mg Intramuscular Given 3/4/25 0640)   HYDROcodone-acetaminophen (NORCO) 5-325 MG per tablet 1 tablet (1 tablet Oral Given 3/4/25 0640)        ED Course:         Labs:    Lab Results (last 24 hours)       ** No results found for the last 24 hours. **             Imaging:    No Radiology Exams Resulted Within Past 24 Hours      Differential Diagnosis and Discussion:    Back Pain: The patient presents with back pain. My differential diagnosis includes but is not limited to acute spinal epidural abscess, acute spinal epidural bleed, cauda equina syndrome, abdominal aortic aneurysm, aortic dissection, kidney stone, pyelonephritis, musculoskeletal back pain, spinal fracture, and osteoarthritis.   Epistaxis: Differential diagnosis includes but is not limited to  trauma, environmental exposure, coagulopathy, allergic, infectious, hypertension, foreign bodies, hormonal, and tumors.    PROCEDURES:        No orders to display       Procedures    MDM           This patient is a 24-year-old male with previous history of lower back pain and bulging disks, presenting from work with worsening low back pain.    No injury or any red flags for the back pain including no fevers or neurologic deficits or bowel or bladder incontinence was found today.    He was seen here and had lumbar spine x-rays a couple months ago in the ED showing degenerative disc disease.    I think for today in the ED he just needs some pain control and supportive care instructions and follow-up with his spine doctor.    I am giving him a Norco pain pill and a shot of Toradol intramuscularly and offered him a work excuse note.        Secondly, he had a nosebleed earlier that resolved prior to arrival and I do not see any evidence of epistaxis currently or any blood going down the posterior pharynx.  He is not on blood thinners and he looks hemodynamically stable and I do not think he warrants any blood work today.    He was given supportive care instructions and discharged home.                  Patient Care Considerations:          Consultants/Shared Management Plan:        Social Determinants of Health:    Patient is independent, reliable, and has access to care.       Disposition and Care Coordination:    Discharged: The patient is suitable and stable for discharge with no need for consideration of admission.    I have explained the patient´s condition, diagnoses and treatment plan based on the information available to me at this time. I have answered questions and addressed any concerns. The patient has a good  understanding of the patient´s diagnosis, condition, and treatment plan as can be expected at this point. The vital signs have been stable. The patient´s condition is stable and appropriate for  discharge from the emergency department.      The patient will pursue further outpatient evaluation with the primary care physician or other designated or consulting physician as outlined in the discharge instructions. They are agreeable to this plan of care and follow-up instructions have been explained in detail. The patient has received these instructions in written format and has expressed an understanding of the discharge instructions. The patient is aware that any significant change in condition or worsening of symptoms should prompt an immediate return to this or the closest emergency department or call to 911.  I have explained discharge medications and the need for follow up with the patient/caretakers. This was also printed in the discharge instructions. Patient was discharged with the following medications and follow up:      Medication List        New Prescriptions      HYDROcodone-acetaminophen 5-325 MG per tablet  Commonly known as: NORCO  Take 1 tablet by mouth Every 6 (Six) Hours As Needed for Severe Pain.               Where to Get Your Medications        These medications were sent to [id number] - [place] [id number] [facility] AT [facility] OF [address] - [phone] [facility] [phone]   [address] [facility] [address]      Phone: [phone]   HYDROcodone-acetaminophen 5-325 MG per tablet      Demarcus Montoya MD  [address] [facility] [address] [facility] [address]  [facility] [address] [phone]    In 2 days  As needed, If symptoms worsen, for a follow-up appointment       Final diagnoses:   Acute on chronic low back pain   Bulging of lumbar intervertebral disc   Epistaxis        ED Disposition       ED Disposition   Discharge    Condition   Stable    Comment   --               This medical record created using voice recognition software.             Gibran Brown MD  03/04/25 [id number]

## 2025-05-16 ENCOUNTER — HOSPITAL ENCOUNTER (EMERGENCY)
Facility: HOSPITAL | Age: 25
Discharge: HOME | End: 2025-05-16
Attending: EMERGENCY MEDICINE
Payer: MEDICAID

## 2025-05-16 ENCOUNTER — APPOINTMENT (OUTPATIENT)
Dept: GENERAL RADIOLOGY | Facility: HOSPITAL | Age: 25
End: 2025-05-16
Payer: MEDICAID

## 2025-05-16 VITALS
RESPIRATION RATE: 20 BRPM | HEART RATE: 106 BPM | OXYGEN SATURATION: 95 % | WEIGHT: 242.06 LBS | TEMPERATURE: 98 F | SYSTOLIC BLOOD PRESSURE: 141 MMHG | HEIGHT: 71 IN | DIASTOLIC BLOOD PRESSURE: 79 MMHG | BODY MASS INDEX: 33.89 KG/M2

## 2025-05-16 DIAGNOSIS — J20.9 ACUTE BRONCHITIS, UNSPECIFIED ORGANISM: Primary | ICD-10-CM

## 2025-05-16 DIAGNOSIS — J06.9 UPPER RESPIRATORY TRACT INFECTION, UNSPECIFIED TYPE: ICD-10-CM

## 2025-05-16 LAB
FLUAV RNA RESP QL NAA+PROBE: NOT DETECTED
FLUBV RNA RESP QL NAA+PROBE: NOT DETECTED
RSV RNA RESP QL NAA+PROBE: NOT DETECTED
S PYO AG THROAT QL: NEGATIVE
SARS-COV-2 RNA RESP QL NAA+PROBE: NOT DETECTED

## 2025-05-16 PROCEDURE — 71045 X-RAY EXAM CHEST 1 VIEW: CPT

## 2025-05-16 PROCEDURE — 94760 N-INVAS EAR/PLS OXIMETRY 1: CPT

## 2025-05-16 PROCEDURE — 94799 UNLISTED PULMONARY SVC/PX: CPT

## 2025-05-16 PROCEDURE — 87880 STREP A ASSAY W/OPTIC: CPT

## 2025-05-16 PROCEDURE — 94640 AIRWAY INHALATION TREATMENT: CPT

## 2025-05-16 PROCEDURE — 87637 SARSCOV2&INF A&B&RSV AMP PRB: CPT

## 2025-05-16 PROCEDURE — 99283 EMERGENCY DEPT VISIT LOW MDM: CPT

## 2025-05-16 PROCEDURE — 87081 CULTURE SCREEN ONLY: CPT

## 2025-05-16 RX ORDER — IPRATROPIUM BROMIDE AND ALBUTEROL SULFATE 2.5; .5 MG/3ML; MG/3ML
3 SOLUTION RESPIRATORY (INHALATION) ONCE
Status: COMPLETED | OUTPATIENT
Start: 2025-05-16 | End: 2025-05-16

## 2025-05-16 RX ORDER — BACLOFEN 10 MG/1
10 TABLET ORAL 2 TIMES DAILY
COMMUNITY

## 2025-05-16 RX ORDER — METHYLPREDNISOLONE 4 MG/1
TABLET ORAL
Qty: 21 TABLET | Refills: 0 | Status: SHIPPED | OUTPATIENT
Start: 2025-05-16 | End: 2025-05-22

## 2025-05-16 RX ORDER — ALBUTEROL SULFATE 1.25 MG/3ML
1 SOLUTION RESPIRATORY (INHALATION) EVERY 6 HOURS PRN
Qty: 30 EACH | Refills: 0 | Status: SHIPPED | OUTPATIENT
Start: 2025-05-16

## 2025-05-16 RX ORDER — AZITHROMYCIN 250 MG/1
TABLET, FILM COATED ORAL
Qty: 6 TABLET | Refills: 0 | Status: SHIPPED | OUTPATIENT
Start: 2025-05-16 | End: 2025-05-21

## 2025-05-16 RX ADMIN — IPRATROPIUM BROMIDE AND ALBUTEROL SULFATE 3 ML: .5; 3 SOLUTION RESPIRATORY (INHALATION) at 16:22

## 2025-05-16 NOTE — DISCHARGE INSTRUCTIONS
You are negative for COVID, flu, RSV, strep.  Your chest x-ray does not show any signs of pneumonia.  I believe that you benefit from utilizing the nebulizer machine at home every 4 hours as needed for wheezing or shortness of breath.  Take full course of antibiotics steroids as prescribed.  Return to the emergency department for new or worsening symptoms.

## 2025-05-16 NOTE — ED PROVIDER NOTES
Time: 4:04 PM EDT  Date of encounter:  5/16/2025  Independent Historian/Clinical History and Information was obtained by:   Patient    History is limited by: N/A    Chief Complaint: URI      History of Present Illness:  Patient is a 25 y.o. year old male who presents to the emergency department for evaluation of URI.  Patient states on Tuesday he started having a sore throat and since then he has developed congestion, headache, productive cough, chills, subjective fever.  Patient states he has some intermittent shortness of air and chest tightness.  Patient states he will have some sharp chest pains with his cough and points under the left ribs where the area of pain is.  Patient is a smoker, smokes a half a pack per day.  Patient states he is here today because he tried to go to work the past 2 days and his boss sent him home because he did not want other people sick.      Patient Care Team  Primary Care Provider: Demarcus Montoya MD    Past Medical History:     No Known Allergies  Past Medical History:   Diagnosis Date    Depression      Past Surgical History:   Procedure Laterality Date    ANKLE OPEN REDUCTION INTERNAL FIXATION Right     INCISION AND DRAINAGE ABSCESS Right 7/12/2023    Procedure: INCISION AND DRAINAGE ABSCESS right axilla;  Surgeon: Edgardo Forbes MD;  Location: [facility] OR;  Service: General;  Laterality: Right;    KNEE ACL RECONSTRUCTION Right     KNEE ARTHROSCOPY W/ MENISCAL REPAIR Right      Family History   Problem Relation Age of Onset    Hypertension Father     Diabetes Maternal Grandfather     Hypertension Maternal Grandfather     Diabetes Cousin        Home Medications:  Prior to Admission medications    Medication Sig Start Date End Date Taking? Authorizing Provider   famotidine (PEPCID) 40 MG tablet TAKE 1 TABLET BY MOUTH TWICE DAILY 12/2/24   Randolph Bass Jr., MD   HYDROcodone-acetaminophen (NORCO) 5-325 MG per tablet Take 1 tablet by mouth Every 6 (Six) Hours  "As Needed for Severe Pain. 3/4/25   Gibran Brown MD   naproxen (NAPROSYN) 250 MG tablet Take 800 mg by mouth 2 (Two) Times a Day As Needed for Mild Pain.  Patient not taking: Reported on 10/24/2024    ProviderRanda MD   omeprazole (priLOSEC) 40 MG capsule Take 1 capsule by mouth Daily.  Patient not taking: Reported on 10/24/2024 9/3/24   Randolph Bass Jr., MD   sucralfate (CARAFATE) 1 g tablet Take 1 tablet by mouth 4 (Four) Times a Day.  Patient not taking: Reported on 9/26/2024 8/16/24   Luis Noble PA        Social History:   Social History     Tobacco Use    Smoking status: Every Day     Current packs/day: 1.00     Average packs/day: 1 pack/day for 7.0 years (7.0 ttl pk-yrs)     Types: Cigarettes     Start date: 5/10/2018     Passive exposure: Current    Smokeless tobacco: Never   Vaping Use    Vaping status: Never Used   Substance Use Topics    Alcohol use: Not Currently     Comment: occ    Drug use: Never         Review of Systems:  Review of Systems   Constitutional:  Positive for chills. Negative for fever.   HENT:  Positive for congestion and sore throat. Negative for ear pain.    Respiratory:  Positive for cough, chest tightness and shortness of breath.    Cardiovascular:  Negative for chest pain.   Gastrointestinal:  Positive for diarrhea. Negative for nausea and vomiting.   Genitourinary:  Negative for dysuria and frequency.   Neurological:  Positive for headaches.        Physical Exam:  /85 (BP Location: Right arm, Patient Position: Sitting)   Pulse 93   Temp 97.7 °F (36.5 °C) (Oral)   Resp 16   Ht 180.3 cm (71\")   Wt 110 kg (242 lb 1 oz)   SpO2 97%   BMI 33.76 kg/m²     Physical Exam  Vitals and nursing note reviewed.   Constitutional:       General: He is not in acute distress.     Appearance: Normal appearance. He is well-developed and normal weight. He is not ill-appearing, toxic-appearing or diaphoretic.   HENT:      Head: Normocephalic and atraumatic.      Right " Ear: Tympanic membrane and ear canal normal.      Left Ear: Tympanic membrane and ear canal normal.      Nose: Congestion present.      Mouth/Throat:      Mouth: Mucous membranes are moist. No oral lesions.      Pharynx: Oropharynx is clear. Uvula midline. Posterior oropharyngeal erythema present. No pharyngeal swelling, oropharyngeal exudate or uvula swelling.      Tonsils: No tonsillar exudate or tonsillar abscesses.   Eyes:      Conjunctiva/sclera: Conjunctivae normal.      Pupils: Pupils are equal, round, and reactive to light.   Cardiovascular:      Rate and Rhythm: Normal rate and regular rhythm.      Heart sounds: Normal heart sounds.   Pulmonary:      Effort: Pulmonary effort is normal.      Breath sounds: Rhonchi (Diffuse) present.   Abdominal:      General: Abdomen is flat. There is no distension.      Palpations: Abdomen is soft.   Musculoskeletal:         General: Normal range of motion.      Cervical back: Normal range of motion and neck supple.   Skin:     General: Skin is warm and dry.   Neurological:      General: No focal deficit present.      Mental Status: He is alert and oriented to person, place, and time.   Psychiatric:         Mood and Affect: Mood normal.         Behavior: Behavior normal.         Thought Content: Thought content normal.         Judgment: Judgment normal.                  Medical Decision Making:    Comorbidities that affect care:    Smoking    External Notes reviewed:    Previous Radiological Studies: Chest x-ray completed on 1-      The following orders were placed and all results were independently analyzed by me:  Orders Placed This Encounter   Procedures    Home Nebulizer    COVID-19, FLU A/B, RSV PCR 1 HR TAT - Swab, Nasopharynx    Rapid Strep A Screen - Swab, Throat    Beta Strep Culture, Throat - Swab, Throat    XR Chest 1 View       Medications Given in the Emergency Department:  Medications   ipratropium-albuterol (DUO-NEB) nebulizer solution 3 mL (3 mL  Nebulization Given 5/16/25 1622)        ED Course:         Labs:    Lab Results (last 24 hours)       Procedure Component Value Units Date/Time    COVID-19, FLU A/B, RSV PCR 1 HR TAT - Swab, Nasopharynx [[id number]]  (Normal) Collected: 05/16/25 1607    Specimen: Swab from Nasopharynx Updated: 05/16/25 1652     COVID19 Not Detected     Influenza A PCR Not Detected     Influenza B PCR Not Detected     RSV, PCR Not Detected    Narrative:      Fact sheet for providers: https://www.fda.gov/media/433496/download    Fact sheet for patients: https://www.fda.gov/media/409545/download    Test performed by PCR.    Rapid Strep A Screen - Swab, Throat [[id number]]  (Normal) Collected: 05/16/25 1607    Specimen: Swab from Throat Updated: 05/16/25 1620     Strep A Ag Negative    Beta Strep Culture, Throat - Swab, Throat [[id number]] Collected: 05/16/25 1607    Specimen: Swab from Throat Updated: 05/16/25 1620             Imaging:    XR Chest 1 View  Result Date: 5/16/2025  XR CHEST 1 VW Date of Exam: 5/16/2025 4:26 PM EDT Indication: Cough Comparison: AP chest 1/14/2021 Findings: Clear lungs. Normal heart size. No pleural effusion, pneumothorax or acute osseous normality     Impression: No acute chest finding. Electronically Signed: Lavinia Barnes MD  5/16/2025 4:41 PM EDT  Workstation ID: [id number]        Differential Diagnosis and Discussion:    Cough: Differential diagnosis includes but is not limited to pneumonia, acute bronchitis, upper respiratory infection, ACE inhibitor use, allergic reaction, epiglottitis, seasonal allergies, chemical irritants, exercise-induced asthma, viral syndrome.  Viral syndrome: Differential diagnosis includes but is not limited to influenza, common cold, COVID-19, RSV, adenovirus, enteroviruses, herpes virus, hepatitis virus, measles, mumps, rubella, dengue fever, and possible bacterial infection.    PROCEDURES:    Labs were collected in the emergency department and all labs were reviewed and  interpreted by me.  X-ray were performed in the emergency department and all X-ray impressions were independently interpreted by me.    No orders to display       Procedures    MDM  Number of Diagnoses or Management Options  Acute bronchitis, unspecified organism  Upper respiratory tract infection, unspecified type  Diagnosis management comments: Patient presented to the emergency department today for evaluation of URI symptoms.  Rapid influenza, COVID, RSV, strep testing is negative.  Chest x-ray did not show any signs of pneumonia.  On physical exam patient had diffuse rhonchi.  I did give patient DuoNeb in the emergency department had significant improvement of his lung sounds.  Will discharge patient home with nebulizer machine and albuterol nebulizer solution.  Will also provide him with azithromycin and Medrol Dosepak.  Return to the emergency department guidelines provided.       Amount and/or Complexity of Data Reviewed  Clinical lab tests: reviewed and ordered  Tests in the radiology section of CPT®: reviewed and ordered    Risk of Complications, Morbidity, and/or Mortality  Presenting problems: moderate  Diagnostic procedures: low  Management options: low    Patient Progress  Patient progress: stable       Patient Care Considerations:    CT CHEST: I considered ordering a CT scan of the chest, however patient is not hypoxic or tachycardic      Consultants/Shared Management Plan:    SHARED VISIT: I have discussed the case with my supervising physician, Dr. Garber who states agrees with workup. The substantive portion of the medical decision was made by the attesting physician who made or approve the management plan and will take responsibility for the patient.  Clinical findings were discussed and ultimate disposition was made in consult with supervising physician.    Social Determinants of Health:    Patient is independent, reliable, and has access to care.       Disposition and Care  Coordination:    Discharged: The patient is suitable and stable for discharge with no need for consideration of admission.    I have explained the patient´s condition, diagnoses and treatment plan based on the information available to me at this time. I have answered questions and addressed any concerns. The patient has a good  understanding of the patient´s diagnosis, condition, and treatment plan as can be expected at this point. The vital signs have been stable. The patient´s condition is stable and appropriate for discharge from the emergency department.      The patient will pursue further outpatient evaluation with the primary care physician or other designated or consulting physician as outlined in the discharge instructions. They are agreeable to this plan of care and follow-up instructions have been explained in detail. The patient has received these instructions in written format and has expressed an understanding of the discharge instructions. The patient is aware that any significant change in condition or worsening of symptoms should prompt an immediate return to this or the closest emergency department or call to 911.  I have explained discharge medications and the need for follow up with the patient/caretakers. This was also printed in the discharge instructions. Patient was discharged with the following medications and follow up:      Medication List        New Prescriptions      albuterol 1.25 MG/3ML nebulizer solution  Commonly known as: ACCUNEB  Take 3 mL by nebulization Every 6 (Six) Hours As Needed for Wheezing.     azithromycin 250 MG tablet  Commonly known as: ZITHROMAX  Take 2 tablets by mouth Daily for 1 day, THEN 1 tablet Daily for 4 days.  Start taking on: May 16, 2025     methylPREDNISolone 4 MG dose pack  Commonly known as: MEDROL  Take 6 tablets by mouth Daily for 1 day, THEN 5 tablets Daily for 1 day, THEN 4 tablets Daily for 1 day, THEN 3 tablets Daily for 1 day, THEN 2 tablets Daily  for 1 day, THEN 1 tablet Daily for 1 day. Take as directed on package instructions.  Start taking on: May 16, 2025               Where to Get Your Medications        These medications [facility] sent to [id number] - [place] - [address] AT [facility] OF [address] & [place] - [phone]  - [phone]   [address] [place] [address]      Phone: [phone]   albuterol 1.25 MG/3ML nebulizer solution  azithromycin 250 MG tablet  methylPREDNISolone 4 MG dose pack      Demarcus Montoya MD  [address] [facility] [address]  [facility] [address] [phone]             Final diagnoses:   Acute bronchitis, unspecified organism   Upper respiratory tract infection, unspecified type        ED Disposition       ED Disposition   Discharge    Condition   Stable    Comment   --               This medical record created using voice recognition software.             Juancarlos Maldonado PA-C  05/16/25 1087

## 2025-05-16 NOTE — Clinical Note
[facility] EMERGENCY ROOM  [address] [place] [address]  Phone: [phone]  Fax: [phone]    Mat Otto was seen and treated in our emergency department on 5/16/2025.  He may return to work on 05/17/2025.         Thank you for choosing [facility].    Juancarlos Maldonado PA-C

## 2025-05-16 NOTE — ED PROVIDER NOTES
"SHARED VISIT ATTESTATION:    This visit was performed by myself and an APC.  I personally approved the management plan/medical decision making and take responsibility for the patient management.      SHARED VISIT NOTE:    Patient is 25 y.o. year old male that presents to the ED for evaluation of URI symptoms.     Physical Exam    ED Course:    /79 (BP Location: Right arm, Patient Position: Sitting)   Pulse 106   Temp 98 °F (36.7 °C) (Oral)   Resp 20   Ht 180.3 cm (71\")   Wt 110 kg (242 lb 1 oz)   SpO2 95%   BMI 33.76 kg/m²       The following orders were placed and all results were independently analyzed by me:  Orders Placed This Encounter   Procedures    Home Nebulizer    COVID-19, FLU A/B, RSV PCR 1 HR TAT - Swab, Nasopharynx    Rapid Strep A Screen - Swab, Throat    Beta Strep Culture, Throat - Swab, Throat    XR Chest 1 View       Medications Given in the Emergency Department:  Medications   ipratropium-albuterol (DUO-NEB) nebulizer solution 3 mL (3 mL Nebulization Given 5/16/25 1622)        ED Course:         Labs:    Lab Results (last 24 hours)       Procedure Component Value Units Date/Time    COVID-19, FLU A/B, RSV PCR 1 HR TAT - Swab, Nasopharynx [[id number]]  (Normal) Collected: 05/16/25 1607    Specimen: Swab from Nasopharynx Updated: 05/16/25 1652     COVID19 Not Detected     Influenza A PCR Not Detected     Influenza B PCR Not Detected     RSV, PCR Not Detected    Narrative:      Fact sheet for providers: https://www.fda.gov/media/275656/download    Fact sheet for patients: https://www.fda.gov/media/272859/download    Test performed by PCR.    Rapid Strep A Screen - Swab, Throat [[id number]]  (Normal) Collected: 05/16/25 1607    Specimen: Swab from Throat Updated: 05/16/25 1620     Strep A Ag Negative    Beta Strep Culture, Throat - Swab, Throat [[id number]] Collected: 05/16/25 1607    Specimen: Swab from Throat Updated: 05/16/25 1620             Imaging:    XR Chest 1 View  Result Date: " 5/16/2025  XR CHEST 1 VW Date of Exam: 5/16/2025 4:26 PM EDT Indication: Cough Comparison: AP chest 1/14/2021 Findings: Clear lungs. Normal heart size. No pleural effusion, pneumothorax or acute osseous normality     Impression: No acute chest finding. Electronically Signed: Lavinia Barnes MD  5/16/2025 4:41 PM EDT  Workstation ID: [id number]      MDM:    Procedures    Labs were collected in the emergency department and all labs were reviewed and interpreted by me.  X-ray were performed in the emergency department and all X-ray impressions were independently interpreted by me.                     Arash Garber MD  20:38 EDT  05/16/25         Arash Garber MD  05/16/25 2038

## 2025-05-18 LAB — BACTERIA SPEC AEROBE CULT: NORMAL

## (undated) DEVICE — SUT SILK 2/0 FS BLK 18IN 685G

## (undated) DEVICE — DRAPE,LAPAROTOMY,PCH,STERILE: Brand: MEDLINE

## (undated) DEVICE — SLV SCD KN/LEN ADJ EXPRSS BLENDED MD 1P/U

## (undated) DEVICE — GAUZE,SPONGE,4"X4",16PLY,STRL,LF,10/TRAY: Brand: MEDLINE

## (undated) DEVICE — 3M™ IOBAN™ 2 ANTIMICROBIAL INCISE DRAPE 6650EZ: Brand: IOBAN™ 2

## (undated) DEVICE — SYR LUERLOK 30CC

## (undated) DEVICE — SUT MNCRYL 4/0 PS2 18 IN

## (undated) DEVICE — STERILE POLYISOPRENE POWDER-FREE SURGICAL GLOVES WITH EMOLLIENT COATING: Brand: PROTEXIS

## (undated) DEVICE — ELECTRD BLD EDGE COAT 3IN

## (undated) DEVICE — STERILE POLYISOPRENE POWDER-FREE SURGICAL GLOVES: Brand: PROTEXIS

## (undated) DEVICE — ANTIBACTERIAL VIOLET BRAIDED (POLYGLACTIN 910), SYNTHETIC ABSORBABLE SUTURE: Brand: COATED VICRYL

## (undated) DEVICE — GLV SURG BIOGEL LTX PF 7 1/2

## (undated) DEVICE — PENCL E/S SMOKEEVAC W/TELESCP CANN

## (undated) DEVICE — BANDAGE,GAUZE,BULKEE II,4.5"X4.1YD,STRL: Brand: MEDLINE

## (undated) DEVICE — SOL IRR H2O BTL 1000ML STRL

## (undated) DEVICE — INTENDED FOR TISSUE SEPARATION, AND OTHER PROCEDURES THAT REQUIRE A SHARP SURGICAL BLADE TO PUNCTURE OR CUT.: Brand: BARD-PARKER ® CARBON RIB-BACK BLADES

## (undated) DEVICE — APPL CHLORAPREP HI/LITE 26ML ORNG

## (undated) DEVICE — GOWN,REINFRCE,POLY,SIRUS,BREATH SLV,XXLG: Brand: MEDLINE

## (undated) DEVICE — MAJOR-LF: Brand: MEDLINE INDUSTRIES, INC.